# Patient Record
Sex: FEMALE | Race: WHITE | NOT HISPANIC OR LATINO | Employment: PART TIME | URBAN - METROPOLITAN AREA
[De-identification: names, ages, dates, MRNs, and addresses within clinical notes are randomized per-mention and may not be internally consistent; named-entity substitution may affect disease eponyms.]

---

## 2017-01-03 PROBLEM — D17.1 LIPOMA OF BACK: Status: ACTIVE | Noted: 2017-01-03

## 2017-01-03 NOTE — H&P
New Patient Visit Note       Active Problems      1. Lipoma of back        Chief Complaint   Cyst    History of Present Illness   Pt seen at the request of Dr. Fleta Lennox for a lump on his upper back. Noticed this several months ago.   Mild discomfort at the si distention and anal bleeding. Genitourinary: Negative for dysuria, urgency, frequency and difficulty urinating. Musculoskeletal: Negative for myalgias and arthralgias. Skin: Negative for color change and rash.    Neurological: Negative for tremors, sy

## 2017-01-27 NOTE — PROGRESS NOTES
Post Operative Visit Note       Active Problems  1. Lipoma of back         Chief Complaint   Post-Op     History of Present Illness   Doing well. No pain. Back to classes. Pathology results d/w patient - lipoma.       Allergies  Naz Irvin has No Known Aller dysuria, urgency, frequency and difficulty urinating. Musculoskeletal: Negative for myalgias and arthralgias. Skin: Negative for color change and rash. Neurological: Negative for tremors, syncope and weakness.    Hematological: Negative for adenopathy

## 2019-01-07 NOTE — PATIENT INSTRUCTIONS
Advice rest, heating pad, meloxicam as needed. Take cyclobenzaprine as needed. Medication will make you drowsy, so no driving after taking medication. Start physical therapy.   Return to clinic if any questions, worsening course,  new concerns or no impr

## 2019-01-07 NOTE — PROGRESS NOTES
[facility] [place]  PROGRESS NOTE  Chief Complaint:   Patient presents with:  Establish Care  Neck Pain: Left side      HPI:   This is a 24year old male presents to clinic complaining of left upper back and left neck pain that has been going on vomiting, constipation, diarrhea, or blood in stool. MUSCULOSKELETAL: See HPI  NEUROLOGICAL:  Denies headache, dizziness, syncope, numbness or tingling. HEMATOLOGIC:  Denies anemia, bleeding or bruising.   LYMPHATICS:  Denies enlarged nodes   PSYCHIATRIC: EXTERNAL    Other orders  -     Cyclobenzaprine HCl 5 MG Oral Tab; Take 1 tablet (5 mg total) by mouth nightly as needed for Muscle spasms.  -     Meloxicam 7.5 MG Oral Tab; Take 1 tablet (7.5 mg total) by mouth 2 (two) times daily.       Patient Instructio

## 2019-02-08 ENCOUNTER — WALK IN (OUTPATIENT)
Dept: URGENT CARE | Age: 24
End: 2019-02-08

## 2019-02-08 VITALS
HEIGHT: 72 IN | WEIGHT: 220 LBS | RESPIRATION RATE: 16 BRPM | TEMPERATURE: 98 F | HEART RATE: 76 BPM | SYSTOLIC BLOOD PRESSURE: 112 MMHG | DIASTOLIC BLOOD PRESSURE: 78 MMHG | BODY MASS INDEX: 29.8 KG/M2

## 2019-02-08 DIAGNOSIS — J06.9 VIRAL UPPER RESPIRATORY INFECTION: Primary | ICD-10-CM

## 2019-02-08 PROCEDURE — 99213 OFFICE O/P EST LOW 20 MIN: CPT | Performed by: NURSE PRACTITIONER

## 2019-02-08 RX ORDER — FLUTICASONE PROPIONATE 50 MCG
2 SPRAY, SUSPENSION (ML) NASAL DAILY
Qty: 16 G | Refills: 0 | Status: SHIPPED | OUTPATIENT
Start: 2019-02-08

## 2019-02-08 ASSESSMENT — ENCOUNTER SYMPTOMS
FATIGUE: 1
GASTROINTESTINAL NEGATIVE: 1
RESPIRATORY NEGATIVE: 1
SINUS PAIN: 0
SINUS PRESSURE: 1
TROUBLE SWALLOWING: 0
EYES NEGATIVE: 1
FEVER: 0

## 2019-03-23 NOTE — PROGRESS NOTES
CHIEF COMPLAINT:   Patient presents with:  Ear Pain: Right ear  Headache  Jaw Pain: Right side      HPI:   Filiberto Johnson is a 24year old male who presents to clinic today with complaints of pain to left ear and to jaw- no pain with chewing   But has ha mucosa pink, moist. Posterior pharynx not erythematous or injected. No exudates. - small ulceration to left buccal surface- mild erythema, mildly tender  NECK: supple, non-tender  THYROID: Normal size, no nodules  LUNGS: clear to auscultation bilaterally, n

## 2019-03-23 NOTE — PATIENT INSTRUCTIONS
Rinse with Biotene-     Monitor your mouth sore- if it gets worse- more painful, red, fever,etc- then fill Prescription for keflex   Avoid chewy foods, excessive talking and gum, and foods that require you to open mouth widely.   Ice to jaw if helpful, bite

## 2019-03-27 NOTE — PROGRESS NOTES
[address]  PROGRESS NOTE  Chief Complaint:   Patient presents with: Follow - Up: From EL visit      HPI:   This is a 24year old male presents to clinic for follow-up on stomatitis.   Few days ago patient bit his cheek very hard, has so palpitations, edema, dyspnea on exertion or at rest.  RESPIRATORY:  Denies shortness of breath, wheezing, cough or sputum. GASTROINTESTINAL:  Denies abdominal pain, nausea, vomiting, constipation, diarrhea, or blood in stool.   MUSCULOSKELETAL:  Denies wea orders for this visit:    Stomatitis    Aphthous ulcer of mouth    Other orders  -     triamcinolone acetonide 0.1 % Mouth/Throat Paste; Place 1 Application onto teeth 3 (three) times daily.  Inside L side of mouth        Patient Instructions   Recommend to

## 2019-03-27 NOTE — PATIENT INSTRUCTIONS
Recommend to finish the course of antibiotics. Use topical steroid as needed   Avoid any acidic or spicy food. Return to clinic in 1-2 weeks if no improvement. Sooner if symptoms gets worse.

## 2019-04-25 NOTE — PATIENT INSTRUCTIONS
Rhinocort 2 sprays each nostril once a day      netti pot -        Follow up if symptoms persist or increase - would recommend ENT

## 2019-04-25 NOTE — PROGRESS NOTES
Kathy Swan is a 24year old male. Patient presents with:   Other: food in nose      HPI:   Complaints of eating hash browns- started laughing and they went up his nose from the back   Feels stuffed up in the back of throat/nose   Tried piper pot   Noo RRR without murmur, no edema    ASSESSMENT AND PLAN:     Nasal congestion  Foreign body in nose, initial encounter  (primary encounter diagnosis)    No orders of the defined types were placed in this encounter.       Meds & Refills for this Visit:  Requeste

## 2019-06-13 NOTE — PROGRESS NOTES
Park Head is a 24year old male.   Patient presents with:  Stomach Pain  Diarrhea  Gas      HPI:   Complaints of 5 weeks ago had wisdom teeth -  antibiotic for 3 weeks- sinusitis   Then was on prednisone  For a week (ended last tue 6/4) - had GERD prob 74\"   Wt 231 lb   SpO2 97%   BMI 29.66 kg/m²   GENERAL: well developed, well nourished,in no apparent distress  SKIN: no rashes,no suspicious lesions  HEENT: atraumatic, normocephalic,ears and throat are clear  NECK: supple,no adenopathy, normal thyroid,

## 2019-06-18 NOTE — TELEPHONE ENCOUNTER
Pt c/o severe stomach pain (right and left sided) and pain radiating down into testicular area. Pt states pt is worse today. Also has noticed mucous in stool and black chunks. Pt denies fever, denies vomiting. Pt states he doesn't know what to do.   Pt in

## 2019-06-18 NOTE — TELEPHONE ENCOUNTER
Pt agreed. Pt stated he was at ER at this time. Pt asked to have appt canceled tomorrow. Canceled as requested.

## 2019-06-21 PROBLEM — N28.1 CYST OF RIGHT KIDNEY: Status: ACTIVE | Noted: 2019-06-21

## 2019-06-21 PROBLEM — I88.0 ACUTE MESENTERIC ADENITIS: Status: ACTIVE | Noted: 2019-06-21

## 2019-06-21 PROBLEM — R93.89 ABNORMAL CT OF THE CHEST: Status: ACTIVE | Noted: 2019-06-21

## 2019-06-21 PROBLEM — N20.0 RIGHT KIDNEY STONE: Status: ACTIVE | Noted: 2019-06-21

## 2019-06-21 PROBLEM — K42.9 UMBILICAL HERNIA WITHOUT OBSTRUCTION AND WITHOUT GANGRENE: Status: ACTIVE | Noted: 2019-06-21

## 2019-06-21 NOTE — PATIENT INSTRUCTIONS
Start cephalexin for 10 days. Drink plenty of fluids and don't hold urine for long time. See urologist for kidney stone and kidney cyst.   Go to ER if increase in pain, blood in urine or fever. Recheck in 1 month to check xray.    See Jesús Viera for

## 2019-06-21 NOTE — PROGRESS NOTES
[facility]  [facility]  Chief Complaint:   Patient presents with:  ER F/U      HPI:   This is a 24year old male presents to clinic for follow-up on ER 3 days.   Patient was evaluated for abdominal pain in emergency room, had a labs do CONSTITUTIONAL:  Denies unusual weight gain/loss, fever, chills, or fatigue. EYES:  Denies eye pain, visual loss, blurred vision, double vision or yellow sclerae. HEENT:  Denies hearing loss, sneezing, congestion, runny nose or sore throat.   Chrystine Croak lymphadenopathy, no other lymphadenopathy. MUSCULOSKELETAL: Normal ROM, no joint pain, or muscle weakness in all extremity.    BACK: No tenderness, no spasm, no CVA tenderness noted  NEUROLOGICAL:  No deficit, normal gait, strength and tone, normal reflexe check     Lipoma of back     Acid reflux     Abnormal CT of the chest     Right kidney stone     Cyst of right kidney     Acute mesenteric adenitis     Umbilical hernia without obstruction and without gangrene      Aubree Lehman MD      This note was creat

## 2019-06-25 NOTE — TELEPHONE ENCOUNTER
Recommend to follow up next week for recheck. Ok to wait one week, patient may schedule with other provider if any appointment available.

## 2019-06-25 NOTE — TELEPHONE ENCOUNTER
went to ER over the weekend with chest pains, hospital said wasn't having a heart attack, still have some chest pains, transferred call to [facility]

## 2019-06-25 NOTE — TELEPHONE ENCOUNTER
Transferred call from the . Patient called to set up appt for a ER follow up for chest pain. Patient mentioned to still have similar symptoms that he had at the ER. Luisa transferred call.      Patient states that he was in the ER on 6/22 fo

## 2019-06-25 NOTE — TELEPHONE ENCOUNTER
Patient called back and stating that he was diagnosed with costochondritis. Patient states that he was advised to follow up with PCP in 2-3 days. Told patient I would speak to Dr Marlin Duff and call tomorrow if we can fit him in this week.

## 2019-06-26 NOTE — TELEPHONE ENCOUNTER
Set up appt with [facility].    Future Appointments   Date Time Provider Aime Billingsley   6/26/2019  3[facility] [place]   6/27/2019  4:00 PM ADA Benjamin [facility] [place] [facility] [place]   7/1/2019 10:50 AM [place]

## 2019-06-27 NOTE — PATIENT INSTRUCTIONS
Start Lexapro 10mg a day. Return in 1 week for follow up. Follow up with Gina Menendez in 2 weeks as previously indicated. Increase your fluid and fiber intake, such as pears, prunes, plums, apples. Avoid dairy products.      You have been prescribed or are c this medicine in children. Special care may be needed. What side effects may I notice from receiving this medicine?   Side effects that you should report to your doctor or health care professional as soon as possible:  · allergic reactions like skin rash, naratriptan, rizatriptan, sumatriptan, zolmitriptan  · certain medicines for sleep  · certain medicines that treat or prevent blood clots like warfarin, enoxaparin, dalteparin  · cimetidine  · diuretics  · fentanyl  · furazolidone  · isoniazid  · lithium Also watch out for sudden changes in feelings such as feeling anxious, agitated, panicky, irritable, hostile, aggressive, impulsive, severely restless, overly excited and hyperactive, or not being able to sleep.  If this happens, especially at the beginning

## 2019-06-27 NOTE — PROGRESS NOTES
[facility] [place]  PROGRESS NOTE  Chief Complaint:   Patient presents with:  ER F/U  Blood In Stool      HPI:   This is a 24year old male ER follow up, anxiety, and blood in stool. Multiple ER visits in the last week.      Was given alprazo escitalopram (LEXAPRO) 10 MG Oral Tab Take 1 tablet (10 mg total) by mouth daily. Disp: 30 tablet Rfl: 0   ALPRAZolam 0.25 MG Oral Tab Take 0.25 mg by mouth 2 (two) times daily as needed.    Disp:  Rfl:    cephALEXin 500 MG Oral Cap Take 1 capsule (500 mg t NECK: Supple, no carotid bruit, no JVD, no thyromegaly. LUNGS: Clear to auscultation bilterally, no rales/rhonchi/wheezing. HEART:  Regular rate and rhythm, S1 and S2 are normal, no murmurs, rubs or gallops.   EXTREMITIES: No edema, no cyanosis, no clubbi Patient/Caregiver Education: Patient/Caregiver Education: There are no barriers to learning. Medical education done. Outcome: Patient verbalizes understanding.  Patient is notified to call with any questions, complications, allergies, or worsening or sinclair Take this medicine by mouth with a glass of water. Follow the directions on the prescription label. You can take it with or without food. If it upsets your stomach, take it with food. Take your medicine at regular intervals.  Do not take it more often than · certain medicines for fungal infections like fluconazole, itraconazole, ketoconazole, posaconazole, voriconazole  · cisapride  · citalopram  · dofetilide  · dronedarone  · linezolid  · MAOIs like Carbex, Eldepryl, Marplan, Nardil, and Parnate  · methylen · an unusual or allergic reaction to escitalopram, the related drug citalopram, other medicines, foods, dyes, or preservatives  · pregnant or trying to become pregnant  · breast-feeding  What should I watch for while using this medicine?   Tell your doctor Routine infant or child health check     Lipoma of back     Acid reflux     Abnormal CT of the chest     Right kidney stone     Cyst of right kidney     Acute mesenteric adenitis     Umbilical hernia without obstruction and without gangrene      Angelique Bhatt

## 2019-06-29 NOTE — TELEPHONE ENCOUNTER
Patient is calling stating that he saw [facility] on 6/27 for blood in stool. Patient states that that night after seeing [facility] he has 4 or 5 diarrhea episodes. Patient states he also has no appetite now, a little dizziness, and a headache on and off.      P

## 2019-06-29 NOTE — TELEPHONE ENCOUNTER
Recommend Gatorade and plenty of fluid over the weekend. Also recommend brat diet. Return to clinic next week if continued to have blood in stool or diarrhea does not improve.   Recommend go to the emergency room if any increase in dizziness, abdominal pa

## 2019-07-01 NOTE — PROGRESS NOTES
[address]  PROGRESS NOTE  Chief Complaint:   Patient presents with: Anxiety: Possible SE to medication - not sleeping, neck pain, no appitite       HPI:   This is a 24year old male multiple medical complaints. Here with his fiancee. 500 mg by mouth 2 (two) times daily. Disp:  Rfl:    escitalopram (LEXAPRO) 10 MG Oral Tab Take 1 tablet (10 mg total) by mouth daily.  Disp: 30 tablet Rfl: 0   cephALEXin 500 MG Oral Cap Take 1 capsule (500 mg total) by mouth 3 (three) times daily for 10 da bowel sounds normal in all 4 quadrants, no Masses, no hepatosplenomegaly. SKIN: No rashes, no skin lesion, no bruising, good turgor. LYMPHATIC: No cervical lymphadenopathy, no other lymphadenopathy.   MUSCULOSKELETAL: Normal ROM, no joint pain, or muscle done.   Outcome: Patient verbalizes understanding. Patient is notified to call with any questions, complications, allergies, or worsening or changing symptoms.   Patient is to call with any side effects or complications from the treatments as a result of to

## 2019-07-01 NOTE — PATIENT INSTRUCTIONS
Continue lexapro at this time. Do not nap during the day. Try two walks a day, once in the morning and once at night. Try a warm shower before bed and sleeping in a cool room. Try alternating heat and cold packs 20 minutes at a time four times a day.

## 2019-07-01 NOTE — TELEPHONE ENCOUNTER
Pt called. Concerned with decreased appetite, some changes in bowels and anxious feeling. Started lexapro and in [facility] with Herb Blum. Pt also has xanax RX. D. w pt ok to take xanax 2 x today ( already took this am).  I encourged hm to also call for f

## 2019-07-03 PROBLEM — G43.109 COMPLICATED MIGRAINE: Status: ACTIVE | Noted: 2019-07-03

## 2019-07-05 NOTE — TELEPHONE ENCOUNTER
Re:   poss side effects of new medication  ( Lexapro)    -  Insomina issue frist but cleared up  - now Migrane headache

## 2019-07-05 NOTE — TELEPHONE ENCOUNTER
lexapro does not cause migrine. Recommend to take medication in morning after breakfast.   If symptoms continues then recommend to return to clinic.

## 2019-07-10 NOTE — PATIENT INSTRUCTIONS
Continue lexapro. Wean xanax as able. Continue with medication, counseling sessions, and routine exercise. Return in 2 weeks for follow up.

## 2019-07-10 NOTE — PROGRESS NOTES
[address]  PROGRESS NOTE  Chief Complaint:   Patient presents with: Anxiety  Follow - Up      HPI:   This is a 24year old male anxiety follow up. Patient here for a follow up.       Reports feeling as if he's gotten back to his \"n Denies chest pain, chest pressure, chest discomfort, palpitations, edema, dyspnea on exertion or at rest.  RESPIRATORY:  Denies shortness of breath, wheezing, cough or sputum.   GASTROINTESTINAL:  Denies abdominal pain, nausea, vomiting, constipation, diarr depressed mood or anxiety. ASSESSMENT AND PLAN:   Nadja Helton was seen today for anxiety and follow - up. Diagnoses and all orders for this visit:    Anxiety  Continue lexapro 10mg, discussed not discontinuing medication on his own.    Wean Xanax, use P

## 2019-07-17 NOTE — PROGRESS NOTES
[facility] [place]      HPI:   Tigist Goddard is a 21year old male who presents for an Annual Health Visit. Patient starting intensive group therapy starting today for his anxiety.   Patient has been on his lexapro and felt symptoms have OTHER      wisdom teeth       Family History   Problem Relation Age of Onset   • Asthma Mother    • Anxiety Mother    • Depression Father    • Hypertension Father    • Other (sleep apnea) Father    • Cancer Maternal Grandmother    • Alcohol and Other Disor oz   SpO2 99%   BMI 28.43 kg/m²    Wt Readings from Last 6 Encounters:  07/17/19 : 221 lb 6.4 oz  07/10/19 : 224 lb  07/01/19 : 218 lb 9.6 oz  06/27/19 : 223 lb 8 oz  06/21/19 : 222 lb 9.6 oz  06/13/19 : 231 lb    Estimated body mass index is 28.43 kg/m² a prescribed. Continue with intensive outpatient Psychiatric therapy. Continue follow up with Dr. Gilbert Resides next week as previously scheduled for anxiety and x-ray follow up. Routine yearly physical in 1 year.        The patient indicates understanding o

## 2019-07-17 NOTE — PATIENT INSTRUCTIONS
Make an appointment for fasting lipid panel as well as a thyroid panel. Continue with medications as prescribed. Continue with intensive outpatient Psychiatric therapy.     Continue follow up with Dr. Jv Vides next week as previously scheduled for anxiety a

## 2019-07-22 NOTE — TELEPHONE ENCOUNTER
----- Message from Camryn Antoine sent at 7/22/2019  4:32 PM CDT -----  Returned call.    Please call [phone]

## 2019-07-22 NOTE — TELEPHONE ENCOUNTER
----- Message from ADA Savage sent at 7/22/2019  9:02 AM CDT -----  Please let patient know his cholesterol levels and his thyroid panel are in a normal range.

## 2019-07-25 NOTE — PROGRESS NOTES
[facility] [place]  PROGRESS NOTE  Chief Complaint:   Patient presents with:  ER F/U: F/U after ER and said that he was needing a chest xray to see whats going on with his lungs      HPI:   This is a 21year old male with history of anxiety and Allergies:    Dander                  HIVES    Comment:Dogs & cats. Some dog breeds = Hives; but usually,             just sneezing  Current Meds:    Current Outpatient Medications:  naproxen 500 MG Oral Tab Take 500 mg by mouth 2 (two) times daily.  Tigist Levine normal, no murmurs, rubs or gallops. ABDOMEN: Soft, nondistended, nontender, bowel sounds normal in all 4 quadrants, no Masses, no hepatosplenomegaly. SKIN: No rashes, no skin lesion, no bruising, good turgor.   LYMPHATIC: No cervical lymphadenopathy, no

## 2019-07-25 NOTE — PATIENT INSTRUCTIONS
Continue current medication and therapy. Keep appt with darnell. Check xray today. Return to clinic if any concern.

## 2019-07-26 NOTE — TELEPHONE ENCOUNTER
Patient informed of the following below. Patient is wondering if this clears him from the focal opacity seen on the CT scan from June or what would be his next steps regarding that?

## 2019-07-26 NOTE — TELEPHONE ENCOUNTER
----- Message from Himanshu Ott MD sent at 7/25/2019  6:04 PM CDT -----  Please inform patient that radiologist report shows normal chest x-ray.

## 2019-08-05 PROBLEM — M94.0 COSTOCHONDRITIS: Status: ACTIVE | Noted: 2019-08-05

## 2019-08-05 NOTE — PROGRESS NOTES
[facility] [place]  PROGRESS NOTE  Chief Complaint:   Patient presents with:  Pain: Upper back/shoulder pain   Follow - Up: F/u chostrochondritis      HPI:   This is a 21year old male coming in for follow up from costochondritis.     Patient rep • Diabetes Paternal Grandfather    • Heart Disorder Paternal Grandfather      Allergies:    Dander                  HIVES    Comment:Dogs & cats.  Some dog breeds = Hives; but usually,             just sneezing  Current Meds:    Current Outpatient Medicatio /72   Pulse 82   Temp 97.1 °F (36.2 °C) (Tympanic)   Resp 16   Wt 228 lb   SpO2 98%   BMI 29.27 kg/m²  Estimated body mass index is 29.27 kg/m² as calculated from the following:    Height as of 7/25/19: 74\". Weight as of this encounter: 228 lb. Patient verbalized understanding and agrees to plan of care. Patient/Caregiver Education: Patient/Caregiver Education: There are no barriers to learning. Medical education done. Outcome: Patient verbalizes understanding.  Patient is notified to call · If you feel that emotional stress is a cause of your condition, try to figure out the sources of that stress. It may not be obvious. Learn ways to deal with the stress in your life.  This can include regular exercise, muscle relaxation, meditation, or sim Routine infant or child health check     Lipoma of back     Acid reflux     Abnormal CT of the chest     Right kidney stone     Cyst of right kidney     Acute mesenteric adenitis     Umbilical hernia without obstruction and without gangrene     Complica

## 2019-08-05 NOTE — PATIENT INSTRUCTIONS
Restart naproxen 500mg twice a day for the next week; take with food. Warm, moist heat such as a warm bath or warm shower twice a day. Gentle massage to areas as able. Do light stretching twice a day for the upper chest and back as able.   Enjoy your wed Use this with or without a medicated skin cream that helps relieves pain. · Do stretching exercise as advised by your provider. · Take any prescribed medicines as directed.   Follow-up care  Follow up with your healthcare provider, or as advised, if you d

## 2019-08-24 NOTE — TELEPHONE ENCOUNTER
Future appt:    Last Appointment with provider:   8/5/2019   Last appointment at [facility] [place]:  8/20/2019 with Dr. Ofelia Cardoso for Anxiety; Return in about 3 months (around 10/25/2019) for follow up.      Cholesterol, Total (mg/dL)   Date Value   07/20/2019 117

## 2019-09-04 NOTE — PROGRESS NOTES
Shirlean Canavan is a 21year old male. Patient presents with:  Pain: Still having costochondritis pain off and on - any other treatment?       HPI:   Complaints of pain to sternum-  Pain to left shoulder blade with deep breaths- sometimes left shoulder some • Depression Father    • Hypertension Father    • Other (sleep apnea) Father    • Cancer Maternal Grandmother    • Alcohol and Other Disorders Associated Maternal Grandmother    • Cancer Maternal Grandfather    • Alcohol and Other Disorders Associated Ma this encounter       Imaging & Consults:  CHIROPRACTIC OFFICE VISIT - EXTERNAL  PHYSICAL THERAPY EXTERNAL    No follow-ups on file.   Patient Instructions   Chiropractor, Dr. Puente Meth [phone]     physical therapy -     For your stomach avoid c

## 2019-09-04 NOTE — PATIENT INSTRUCTIONS
Chiropractor, Dr. Brown Sheets [phone]     physical therapy -     For your stomach avoid caffeine, alcohol, cigarettes, spicy/acidic foods, and peppermint. Also eat small meals, do not eat before bedtime, also avoid ibuprofen/Aleve/aspirin.

## 2019-09-12 NOTE — PATIENT INSTRUCTIONS
-Tetanus, diptheria, pertussis - due every 10 years    - flu shot today-  Due every fall     -Gardasil 9 (HPV) - #1 today-  Follow up for #2 in 2 months (November) and #3 in 6 months (March).     We will check blood work for HIV, syphilis, hepatitis A, B, a

## 2019-09-14 NOTE — TELEPHONE ENCOUNTER
----- Message from ADA Faulkner sent at 9/14/2019  8:34 AM CDT -----  Please notify patient that his STD tests have come back negative for gonorrhea, chlamydia, trichomonas, syphilis, HIV, hepatitis A, B, C.   The herpes test is still pending-we wi

## 2019-09-16 NOTE — TELEPHONE ENCOUNTER
Informed pt of his herpes results. Pt has received all this other test results on Saturday. See phone note from 9/14/19 from [facility]. Pt expressed understanding and thanks.

## 2019-09-16 NOTE — TELEPHONE ENCOUNTER
----- Message from ADA Willson sent at 9/15/2019 10:35 PM CDT -----  Please notify patient that his herpes simplex is negative.

## 2019-09-16 NOTE — PROGRESS NOTES
Daniele Mccray is a 21year old male. Patient presents with:  STD      HPI:   Complaints of patient presents to the office today for STD testing.   Patient states that he has not had any current exposure, but is going to be having a new partner and would l Other Disorders Associated Maternal Grandfather    • Diabetes Paternal Grandfather    • Heart Disorder Paternal Grandfather         Allergies    Dander                  HIVES    Comment:Dogs & cats.  Some dog breeds = Hives; but usually,             just sn prescriptions requested or ordered in this encounter       Imaging & Consults:  FLULAVAL INFLUENZA VACCINE QUAD PRESERVATIVE FREE 0.5 ML  HPV HUMAN PAPILLOMA VIRUS VACC 9 BHRATI 3 DOSE IM  TETANUS, DIPHTHERIA TOXOIDS AND ACELLULAR PERTUSIS VACCINE (TDAP), >7

## 2019-10-10 NOTE — TELEPHONE ENCOUNTER
Patient came to the office today to see another provider and mention to them that he was having some heart palpitations. Brought patient back to a room and triaged his symptoms.   Patient states that he notices the heart palpations mostly after he eats a

## 2019-10-11 NOTE — PROGRESS NOTES
HPI:    Patient ID: Dorothea Morton is a 21year old male. HPI     Been feeling heart beats are beating harder, but not faster. Worse after eating or when getting ready for bed. States that during the day has been getting a headache.    Notes that when he naproxen 500 MG Oral Tab, Take 500 mg by mouth 2 (two) times daily as needed. , Disp: , Rfl:   Azelastine HCl 0.1 % Nasal Solution, 2 sprays by Nasal route 2 (two) times daily. , Disp: 1 Bottle, Rfl: 5      Allergies:  Dander                  HIVES    Comm Imaging & Referrals:  ELECTROCARDIOGRAM, COMPLETE  Normal sinus rhythm, heart rate 80, no ectopy    Patient Instructions   EKG - normal    Normal exam.     Trial of omeprazole for the reflux. Call if not improving. Return to clinic if worsens.        Om · muscle spasm  · palpitations  · rash on cheeks or arms that gets worse in the sun  · redness, blistering, peeling or loosening of the skin, including inside the mouth  · seizures  · stomach polyps  · tremors  · unusual bleeding or bruising  · unusually w · an unusual or allergic reaction to omeprazole, other medicines, foods, dyes, or preservatives  · pregnant or trying to get pregnant  · breast-feeding  What should I watch for while using this medicine?   It can take several days before your heartburn gets

## 2019-10-11 NOTE — PATIENT INSTRUCTIONS
EKG - normal    Normal exam.     Trial of omeprazole for the reflux. Call if not improving. Return to clinic if worsens. Omeprazole tablets (OTC)  Brand Name: Prilosec OTC  What is this medicine?   OMEPRAZOLE (oh ME pray zol) prevents the producti the skin, including inside the mouth  · seizures  · stomach polyps  · tremors  · unusual bleeding or bruising  · unusually weak or tired  · yellowing of the eyes or skin  Side effects that usually do not require medical attention (report to your doctor or watch for while using this medicine? It can take several days before your heartburn gets better. Check with your doctor or health care professional if your condition does not start to get better, or if it gets worse.   Do not treat diarrhea with over the c

## 2019-10-23 NOTE — TELEPHONE ENCOUNTER
Future Appointments   Date Time Provider Aime Billingsley   10/24/2019  1:30 PM Andrew Moulds, LC[facility] [place]     No follow up noted

## 2019-11-25 NOTE — TELEPHONE ENCOUNTER
Last refill 10/23/19  Future Appointments   Date Time Provider Aime Billingsley   11/26/2019  1:30 PM Glory Marts, LCPC [facility] [place]     Last visit: annual exam 7/17/19 with Mikey Helton    Instructions         Return in 1 year (on 7/17/2020).

## 2019-12-05 NOTE — TELEPHONE ENCOUNTER
Patient is asking for a referral for [facility] through [facility].      [facility] [address] [place] [address]  [phone]

## 2019-12-11 NOTE — TELEPHONE ENCOUNTER
[facility] in [facility] sent a request for all of pt's medical records. This was sent to [employer].

## 2019-12-18 NOTE — PATIENT INSTRUCTIONS
Continue current medications  Increase lexapro to 20 mg daily. Continue with counselor. Schedule holter monitor. Start omeprazole again. Return to clinic if no improvement in symptoms.

## 2019-12-27 NOTE — TELEPHONE ENCOUNTER
Returned your call. Also would like refill on Lexapro called to [facility].  [phone]    Fax [phone].

## 2019-12-27 NOTE — TELEPHONE ENCOUNTER
Let pt know the following below. Pt verbalized his understanding and had no other questions at this time. Patient is also asking for a refill of his lexapro.   Future Appointments   Date Time Provider Aime Billingsley   1/6/2020 11:00 AM Luisa Cleveland
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
## 2020-02-11 NOTE — TELEPHONE ENCOUNTER
Future appt:    Last Appointment with provider:   1/16/2020 follow up; no follow up noted  [place] [facility] [place] [place]:  1/16/2020  Cholesterol, Total (mg/dL)   Date Value   07/20/2019 117     HDL Cholesterol (mg/dL)   Date Value   07/20/2019 53     L

## 2020-03-06 NOTE — TELEPHONE ENCOUNTER
Future appt:    Last Appointment with provider:   1/16/2020  Last appointment at [facility] [place]:  1/16/2020  Cholesterol, Total (mg/dL)   Date Value   07/20/2019 117     HDL Cholesterol (mg/dL)   Date Value   07/20/2019 53     LDL Cholesterol (mg/dL)   Date

## 2020-04-07 NOTE — TELEPHONE ENCOUNTER
Future appt:    Last Appointment with provider:   1/16/2020  Last appointment at [facility] [place]:  1/16/2020  Cholesterol, Total (mg/dL)   Date Value   07/20/2019 117     HDL Cholesterol (mg/dL)   Date Value   07/20/2019 53     LDL Cholesterol (mg/dL)   Date

## 2020-05-15 NOTE — TELEPHONE ENCOUNTER
Future appt:    Last Appointment with provider:   1/16/2020  Last appointment at [facility] [place]:  1/16/2020  Cholesterol, Total (mg/dL)   Date Value   07/20/2019 117     HDL Cholesterol (mg/dL)   Date Value   07/20/2019 53     LDL Cholesterol (mg/dL)   Date

## 2020-06-12 NOTE — TELEPHONE ENCOUNTER
Please advise refill of Escitalopram 20mg. Last Rx: 5/15/20      Future appt:     Last Appointment with provider:   12/18/19 for Anxiety - per notes - Return in about 3 months     Last appointment at [facility]:  1/16/2020  Cholesterol, Total (mg/dL)   Date Value   07/20/2019 117     HDL Cholesterol (mg/dL)   Date Value   07/20/2019 53     LDL Cholesterol (mg/dL)   Date Value   07/20/2019 49     Triglycerides (mg/dL)   Date Value   07/20/2019 75     No results found for: EAG, A1C  Lab Results   Component Value Date    T4F 1.1 07/20/2019    TSH 1.620 07/20/2019       No follow-ups on file.

## 2020-06-15 NOTE — TELEPHONE ENCOUNTER
Future appt:    Last Appointment with provider:   1/16/2020  Last appointment at [facility]:  1/16/2020    Pt states he took his last Escitalopram today. Pt states he was given 3 day extension with pharmacy.   Pt informed he needs medication follow up ap
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
## 2021-09-06 NOTE — ED PROVIDER NOTES
Patient Seen in: [facility] Emergency Department      History   Patient presents with:  Eval-G    Stated Complaint: Groin pain and nausea    HPI/Subjective:   HPI    This is a pleasant 16-year-old presenting to the emergency department for possible cardiovascular exam shows rate rhythm abdomen soft nontender uncircumcised male no penile discharge no testicular masses no inguinal hernias present no inguinal lymphadenopathy extremity exam is normal skin is intact no focal deficits on neurologic exam List

## 2021-09-09 PROBLEM — R10.31 RIGHT GROIN PAIN: Status: ACTIVE | Noted: 2021-09-09

## 2021-09-09 NOTE — H&P
Office Visit H&P       Active Problems      1. Right groin pain        Chief Complaint   Patient presents with:  Hernia: NW PT ref by ER for RT ING hernia.  PT states that he started having ABD PN (6/10) on and off that moves across ABD, has diarrhea, nause Sister    • Depression Brother      Social History    Socioeconomic History      Marital status: Single      Spouse name: Not on file      Number of children: Not on file      Years of education: Not on file      Highest education level: Not on file    Tob drainage  Eyes:  Negative for eye discharge or vision loss  Gastrointestinal:  Negative for abdominal pain, constipation, diarrhea, or vomiting  Genitourinary:  Negative for dysuria or hematuria  Hema/Lymph:  Negative for easy bleeding or bruising  Integum

## 2021-09-21 NOTE — ED PROVIDER NOTES
Patient Seen in: [facility] Emergency Department      History   Patient presents with:  Abdomen/Flank Pain    Stated Complaint: abdominal pain.      Subjective:   HPI    Patient is a 50-year-old identifying is female presents emergency room with a hi systems reviewed and negative except as noted above.     Physical Exam     ED Triage Vitals [09/20/21 2218]   BP (!) 165/94   Pulse 85   Resp 16   Temp 97.8 °F (36.6 °C)   Temp src Temporal   SpO2 100 %   O2 Device None (Room air)       Current:/84 other components within normal limits   URINALYSIS WITH CULTURE REFLEX - Abnormal; Notable for the following components:    Clarity Urine Hazy (*)     Ketones Urine Trace (*)     Urobilinogen Urine 2.0 (*)     Leukocyte Esterase Urine Trace (*)     WBC Roro Casey Impression:  Abdominal pain, acute  (primary encounter diagnosis)     Disposition:  Discharge  9/21/2021  3:45 am    Follow-up:  Jimmy Horta, [address] [place] [address]   [phone]    Call in 2 days            Medications Pres

## 2021-09-21 NOTE — ED INITIAL ASSESSMENT (HPI)
Pt reports abdominal pain for past week, Miralax taken last noc with little relief. Denies vomiting. Denies urinary symptoms.

## 2022-03-26 NOTE — ED INITIAL ASSESSMENT (HPI)
C/O lower abdominal pain on and off for a week and got worse for few days. Feeling nausea. No vomiting. Denies fever.

## 2022-07-29 NOTE — ED INITIAL ASSESSMENT (HPI)
Headache started last night. Described as pressure. Pt takes sudafed. Pt requesting  covid test. Pt is vaccinated with booster.

## 2022-08-04 NOTE — ED INITIAL ASSESSMENT (HPI)
Pt states that he tested positive for COVID one week ago. Pt c/o of intermittent left sided CP, SOB, and productive cough.

## 2022-08-08 NOTE — ED INITIAL ASSESSMENT (HPI)
C/o sinus pain and pressure for 2 days with post nasal drip. Home kit Covid test positive on 7/29/2022. Covid symptoms-cough, fatigue and headache better.

## 2022-09-27 NOTE — ED INITIAL ASSESSMENT (HPI)
LUQ abdominal pain for about 3 months intermittently, worse in the last few days. +nausea. Denies fevers. Reports pain is worse after eating.

## 2022-12-13 NOTE — DISCHARGE INSTRUCTIONS
Follow-up with your primary care physician this week  Follow-up with gastroenterology for further work-up and evaluation, may need colonoscopy.   Call GI office today for follow-up appointment  Go to the emergency room if worse or increased bleeding

## 2022-12-13 NOTE — ED INITIAL ASSESSMENT (HPI)
Right sided abdominal pain since Wednesday. Reports 3 episodes of bright red blood in stool.      +Nausea

## 2023-01-10 ENCOUNTER — ANESTHESIA EVENT (OUTPATIENT)
Dept: GASTROENTEROLOGY | Age: 28
End: 2023-01-10

## 2023-01-10 ENCOUNTER — ANESTHESIA (OUTPATIENT)
Dept: GASTROENTEROLOGY | Age: 28
End: 2023-01-10

## 2023-01-10 ENCOUNTER — HOSPITAL ENCOUNTER (OUTPATIENT)
Dept: GASTROENTEROLOGY | Age: 28
Discharge: HOME | End: 2023-01-10
Attending: INTERNAL MEDICINE

## 2023-01-10 VITALS
OXYGEN SATURATION: 100 % | TEMPERATURE: 97.7 F | WEIGHT: 250.88 LBS | HEIGHT: 72 IN | SYSTOLIC BLOOD PRESSURE: 135 MMHG | DIASTOLIC BLOOD PRESSURE: 68 MMHG | HEART RATE: 76 BPM | RESPIRATION RATE: 16 BRPM | BODY MASS INDEX: 33.98 KG/M2

## 2023-01-10 DIAGNOSIS — R10.9 PAIN, ABDOMINAL: ICD-10-CM

## 2023-01-10 DIAGNOSIS — K62.5 HEMORRHAGE OF RECTUM AND ANUS: ICD-10-CM

## 2023-01-10 PROCEDURE — 13000024 HB GI COMPLEX CASE S/U + 1ST 15 MIN

## 2023-01-10 PROCEDURE — 10004451 HB PACU RECOVERY 1ST 30 MINUTES

## 2023-01-10 PROCEDURE — 10002801 HB RX 250 W/O HCPCS

## 2023-01-10 PROCEDURE — 10002800 HB RX 250 W HCPCS

## 2023-01-10 PROCEDURE — 10002807 HB RX 258: Performed by: INTERNAL MEDICINE

## 2023-01-10 PROCEDURE — 13000001 HB PHASE II RECOVERY EA 30 MINUTES

## 2023-01-10 PROCEDURE — 13000025 HB GI COMPLEX CASE EACH ADD MINUTE

## 2023-01-10 PROCEDURE — 10002807 HB RX 258

## 2023-01-10 PROCEDURE — 13000008 HB ANESTHESIA MAC OUTSIDE OR

## 2023-01-10 RX ORDER — PROPOFOL 10 MG/ML
INJECTION, EMULSION INTRAVENOUS PRN
Status: DISCONTINUED | OUTPATIENT
Start: 2023-01-10 | End: 2023-01-10

## 2023-01-10 RX ORDER — MELATONIN 10 MG
1 CAPSULE ORAL NIGHTLY PRN
COMMUNITY

## 2023-01-10 RX ORDER — FLUCONAZOLE 150 MG/1
150 TABLET ORAL PRN
COMMUNITY
Start: 2022-08-21

## 2023-01-10 RX ORDER — SODIUM CHLORIDE 9 MG/ML
INJECTION, SOLUTION INTRAVENOUS CONTINUOUS PRN
Status: DISCONTINUED | OUTPATIENT
Start: 2023-01-10 | End: 2023-01-10

## 2023-01-10 RX ORDER — ESTRADIOL 2 MG/1
4 TABLET ORAL DAILY
COMMUNITY

## 2023-01-10 RX ORDER — KETAMINE HYDROCHLORIDE 50 MG/ML
INJECTION, SOLUTION, CONCENTRATE INTRAMUSCULAR; INTRAVENOUS PRN
Status: DISCONTINUED | OUTPATIENT
Start: 2023-01-10 | End: 2023-01-10

## 2023-01-10 RX ORDER — SODIUM CHLORIDE 9 MG/ML
INJECTION, SOLUTION INTRAVENOUS CONTINUOUS
Status: DISCONTINUED | OUTPATIENT
Start: 2023-01-10 | End: 2023-01-12 | Stop reason: HOSPADM

## 2023-01-10 RX ORDER — PROGESTERONE 100 MG/1
100 CAPSULE ORAL DAILY
COMMUNITY

## 2023-01-10 RX ORDER — MIDAZOLAM HYDROCHLORIDE 1 MG/ML
INJECTION, SOLUTION INTRAMUSCULAR; INTRAVENOUS PRN
Status: DISCONTINUED | OUTPATIENT
Start: 2023-01-10 | End: 2023-01-10

## 2023-01-10 RX ORDER — SPIRONOLACTONE 100 MG/1
200 TABLET, FILM COATED ORAL DAILY
COMMUNITY

## 2023-01-10 RX ORDER — HYDROXYZINE PAMOATE 25 MG/1
25 CAPSULE ORAL 3 TIMES DAILY PRN
COMMUNITY

## 2023-01-10 RX ADMIN — KETAMINE HYDROCHLORIDE 50 MG: 50 INJECTION, SOLUTION INTRAMUSCULAR; INTRAVENOUS at 13:05

## 2023-01-10 RX ADMIN — PROPOFOL 30 MG: 10 INJECTION, EMULSION INTRAVENOUS at 13:05

## 2023-01-10 RX ADMIN — MIDAZOLAM HYDROCHLORIDE 2 MG: 1 INJECTION, SOLUTION INTRAMUSCULAR; INTRAVENOUS at 13:04

## 2023-01-10 RX ADMIN — PROPOFOL 50 MG: 10 INJECTION, EMULSION INTRAVENOUS at 13:11

## 2023-01-10 RX ADMIN — PROPOFOL 50 MG: 10 INJECTION, EMULSION INTRAVENOUS at 13:18

## 2023-01-10 RX ADMIN — SODIUM CHLORIDE: 9 INJECTION, SOLUTION INTRAVENOUS at 12:45

## 2023-01-10 RX ADMIN — SODIUM CHLORIDE: 9 INJECTION, SOLUTION INTRAVENOUS at 13:02

## 2023-01-10 RX ADMIN — PROPOFOL 50 MCG/KG/MIN: 10 INJECTION, EMULSION INTRAVENOUS at 13:05

## 2023-01-10 ASSESSMENT — ACTIVITIES OF DAILY LIVING (ADL)
ADL_SCORE: 12
ADL_BEFORE_ADMISSION: INDEPENDENT
HISTORY OF FALLING IN THE LAST YEAR (PRIOR TO ADMISSION): NO

## 2023-01-10 ASSESSMENT — ENCOUNTER SYMPTOMS
CONSTIPATION: 0
NUMBNESS: 0
WHEEZING: 0
CHILLS: 0
BLOOD IN STOOL: 1
COUGH: 0
DIZZINESS: 0
FATIGUE: 0
TROUBLE SWALLOWING: 0
ABDOMINAL PAIN: 0
BACK PAIN: 0
ABDOMINAL DISTENTION: 0
VOMITING: 0
CONFUSION: 0
SHORTNESS OF BREATH: 0
FEVER: 0
HALLUCINATIONS: 0
NAUSEA: 0
EYE ITCHING: 0
EYE PAIN: 0
HEADACHES: 0
DIARRHEA: 0
FACIAL SWELLING: 0
AGITATION: 0

## 2023-01-10 ASSESSMENT — PAIN SCALES - GENERAL
PAINLEVEL_OUTOF10: 0
PAINLEVEL_OUTOF10: 1
PAINLEVEL_OUTOF10: 0
PAINLEVEL_OUTOF10: 0

## 2023-01-10 ASSESSMENT — PAIN SCALES - WONG BAKER
WONGBAKER_NUMERICALRESPONSE: 0
WONGBAKER_NUMERICALRESPONSE: 0

## 2023-01-20 NOTE — DISCHARGE INSTRUCTIONS
Most people get better in 10 to 14 days. You may continue to cough for 2 to 3 weeks. Colds are caused by viruses and do not get better with antibiotics. Any over-the-counter medications will help relieve your symptoms. Mucinex D, this can be obtained from the pharmacist and a 's license is needed to purchase this.   Otherwise Mucinex DM is a decent alternative  Nasal decongestant sprays such as phenylalanine or Afrin  Afrin should only be used for a maximum of 3 days in a row  Start taking one of the following allergy medications Zyrtec, Claritin or Xyzal daily  Cough syrup such as Delsym or Robitussin can help  You may also take pseudoephedrine (Sudafed), this is also purchased from the pharmacist with a valid 's license  Saline rinses with [place] pot  Drink plenty of fluids  Rest, Tylenol and Motrin for aches and pains  Return for fever, shortness of breath, chest pain

## 2023-07-28 NOTE — DISCHARGE INSTRUCTIONS
Continue Tylenol and Motrin alternating. If any worsening symptoms seek evaluation in the emergency room.
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
## 2023-08-15 NOTE — PATIENT INSTRUCTIONS
Flonase as packet insert  Zyrtec daily for post nasal drip  Keep ENT appointment     Follow-up if not improving
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
## 2023-10-26 NOTE — DISCHARGE INSTRUCTIONS
Push fluids and increase fiber intake. Increase fruits and vegetables. May also use over-the-counter supplement such as Benefiber or fiber 1 bar. Give stool softener such as MiraLAX or Colace to help bowel movements easier. Use 1 capful of MiraLAX once daily for 3 to 5 days if you continue to have abdominal pain or do not have a normal bowel movement increase it to 2 capfuls a day. if do not start having normal bowel movements after 3 days may try magnesium citrate or other oral laxative. If still unsuccessful use a glycerin suppository. If this still does not work may use fleets enema over-the-counter. If continue to have issues with constipation, uncontrolled pain, or vomiting go to the ER.

## 2023-10-26 NOTE — ED INITIAL ASSESSMENT (HPI)
Patient c/o sinus pressure and left ear pain x 3 days. Also reports left lateral abd lower rib discomfort. PMD advised patient \"constipation\" had relief with miralax of constipation but intermittent pain continues primarily after eating.

## 2023-12-17 NOTE — ED INITIAL ASSESSMENT (HPI)
Black and red bloody stool for a few weeks - now more frequent; hx of hemorrhoids    LUQ pain , worsens after eating, now more burning pain all over off and on for months, worsening x 2-3 weeks    15lb unintentional wt loss in last 2-3 months    Taking famotidine, tums    No fever/vom

## 2024-01-11 ENCOUNTER — HOSPITAL ENCOUNTER (OUTPATIENT)
Age: 29
Discharge: HOME | End: 2024-01-11
Attending: EMERGENCY MEDICINE
Payer: MEDICAID

## 2024-01-11 VITALS
HEART RATE: 75 BPM | HEIGHT: 72 IN | RESPIRATION RATE: 18 BRPM | DIASTOLIC BLOOD PRESSURE: 77 MMHG | BODY MASS INDEX: 30.48 KG/M2 | SYSTOLIC BLOOD PRESSURE: 141 MMHG | OXYGEN SATURATION: 99 % | TEMPERATURE: 97 F | WEIGHT: 225 LBS

## 2024-01-11 DIAGNOSIS — H66.90 ACUTE OTITIS MEDIA, UNSPECIFIED OTITIS MEDIA TYPE: Primary | ICD-10-CM

## 2024-01-11 PROCEDURE — 99213 OFFICE O/P EST LOW 20 MIN: CPT

## 2024-01-11 RX ORDER — DOXYCYCLINE HYCLATE 100 MG/1
100 CAPSULE ORAL 2 TIMES DAILY
Qty: 20 CAPSULE | Refills: 0 | Status: SHIPPED | OUTPATIENT
Start: 2024-01-11 | End: 2024-01-21

## 2024-01-11 NOTE — ED INITIAL ASSESSMENT (HPI)
Dr. Arenas at the bedside assessing. Patient here for evaluation of right ear pain and drainage x 4 days. States some sinus congestion but has tested negative for COVID at home.

## 2024-01-11 NOTE — ED PROVIDER NOTES
Patient Seen in: [facility]      History     Chief Complaint   Patient presents with    Ear Problem Pain     Stated Complaint: ear pain    Subjective:   HPI    Patient is a 28-year-old patient presents emergency room with a history of right-sided ear pain and drainage which is been ongoing intermittently for last 4 days.  The patient has had other people that have been sick at home with upper respiratory symptoms tested negative for COVID.  Patient denies history of any vomiting or diarrhea.  The patient denies history of any fall or trauma to the ear.  Patient denies history of any other somatic complaints or discomfort at this time.  Past Medical History:   Diagnosis Date    Acid reflux     Spermatocele               Past Surgical History:   Procedure Laterality Date    OTHER      lipoma removal, L upper shoulder    OTHER      wisdom teeth     OTHER      lipoma removed from neck in 2016                Social History     Socioeconomic History    Marital status:    Tobacco Use    Smoking status: Former    Smokeless tobacco: Never    Tobacco comments:     only for 3 months in 2017   Vaping Use    Vaping Use: Never used   Substance and Sexual Activity    Alcohol use: Yes     Alcohol/week: 6.0 standard drinks of alcohol     Types: 6 Standard drinks or equivalent per week     Comment: socially    Drug use: Not Currently     Frequency: 3.0 times per week     Types: Cannabis     Comment: last use: 9/18/20   Other Topics Concern    Caffeine Concern Yes     Comment: 2 can of coke every other day.     Exercise Yes     Comment: Takes long walks 3 times weekly.    Social History Narrative    Single    Work- [employer]-     Grad student.               Review of Systems    Positive for stated complaint: ear pain  Other systems are as noted in HPI.  Constitutional and vital signs reviewed.      All other systems reviewed and negative except as noted above.    Physical Exam     ED Triage  Vitals [01/11/24 1128]   /77   Pulse 75   Resp 18   Temp 97.2 °F (36.2 °C)   Temp src Temporal   SpO2 99 %   O2 Device None (Room air)       Current:/77   Pulse 75   Temp 97.2 °F (36.2 °C) (Temporal)   Resp 18   Ht 185.4 cm (6' 1\")   Wt 102.1 kg   SpO2 99%   BMI 29.69 kg/m²         Physical Exam  GENERAL: Well-developed, well-nourished patient sitting up breathing easily in no apparent distress.  Patient is nontoxic in appearance.  HEENT: Head is normocephalic, atraumatic. Pupils are 4 mm equally round and reactive to light. Oropharynx is clear. Mucous membranes are moist.  Left tympanic membrane is negative.  Right tympanic membrane shows mild erythema and dullness to landmarks consistent with otitis media.  NECK: . No stridor.  LUNGS: Clear to auscultation bilaterally with no wheeze. There is good equal air entry bilaterally.  HEART: Regular rate and rhythm. Normal S1, S2 no S3, or S4. No murmur.  NEURO: Patient is awake, alert and oriented to time place and person. Motor strength is 5 over 5 in all 4 extremities. There are no gross motor or sensory deficits appreciated.  Patient up and ambulatory in the immediate care with a steady gait and no ataxia.           ED Course   Labs Reviewed - No data to display                   MDM     Patient be treated with antibiotics for home instructions to take Tylenol and Motrin for symptoms at home return to the ER immediately if symptoms worsen or if any other problems arise.  Patient discharged home at this time.                                   Medical Decision Making      Disposition and Plan     Clinical Impression:  1. Acute otitis media, unspecified otitis media type         Disposition:  Discharge  1/11/2024 11:40 am    Follow-up:  Karmen Keller, ADA  [address] [facility] [address]  [phone]    In 2 days            Medications Prescribed:  Discharge Medication List as of 1/11/2024 11:43 AM        START taking these medications     Details   doxycycline 100 MG Oral Cap Take 1 capsule (100 mg total) by mouth 2 (two) times daily for 10 days., Normal, Disp-20 capsule, R-0

## 2024-01-16 ENCOUNTER — LAB ENCOUNTER (OUTPATIENT)
Dept: LAB | Age: 29
End: 2024-01-16
Attending: INTERNAL MEDICINE
Payer: MEDICAID

## 2024-01-16 ENCOUNTER — TELEPHONE (OUTPATIENT)
Dept: GASTROENTEROLOGY | Facility: CLINIC | Age: 29
End: 2024-01-16

## 2024-01-16 ENCOUNTER — OFFICE VISIT (OUTPATIENT)
Dept: GASTROENTEROLOGY | Facility: CLINIC | Age: 29
End: 2024-01-16

## 2024-01-16 VITALS
BODY MASS INDEX: 30.88 KG/M2 | WEIGHT: 228 LBS | DIASTOLIC BLOOD PRESSURE: 80 MMHG | SYSTOLIC BLOOD PRESSURE: 126 MMHG | HEIGHT: 72 IN

## 2024-01-16 DIAGNOSIS — K21.9 GASTROESOPHAGEAL REFLUX DISEASE, UNSPECIFIED WHETHER ESOPHAGITIS PRESENT: ICD-10-CM

## 2024-01-16 DIAGNOSIS — K21.9 GASTROESOPHAGEAL REFLUX DISEASE, UNSPECIFIED WHETHER ESOPHAGITIS PRESENT: Primary | ICD-10-CM

## 2024-01-16 DIAGNOSIS — R10.84 GENERALIZED ABDOMINAL PAIN: Primary | ICD-10-CM

## 2024-01-16 DIAGNOSIS — R10.84 GENERALIZED ABDOMINAL PAIN: ICD-10-CM

## 2024-01-16 DIAGNOSIS — R10.13 EPIGASTRIC PAIN: ICD-10-CM

## 2024-01-16 DIAGNOSIS — R10.12 LUQ PAIN: ICD-10-CM

## 2024-01-16 DIAGNOSIS — K58.1 IRRITABLE BOWEL SYNDROME WITH CONSTIPATION: ICD-10-CM

## 2024-01-16 DIAGNOSIS — K82.4 GALLBLADDER POLYP: ICD-10-CM

## 2024-01-16 LAB — IGA SERPL-MCNC: 236 MG/DL (ref 70–312)

## 2024-01-16 PROCEDURE — 99204 OFFICE O/P NEW MOD 45 MIN: CPT | Performed by: INTERNAL MEDICINE

## 2024-01-16 PROCEDURE — 86364 TISS TRNSGLTMNASE EA IG CLAS: CPT

## 2024-01-16 PROCEDURE — 3074F SYST BP LT 130 MM HG: CPT | Performed by: INTERNAL MEDICINE

## 2024-01-16 PROCEDURE — 82784 ASSAY IGA/IGD/IGG/IGM EACH: CPT

## 2024-01-16 PROCEDURE — 3008F BODY MASS INDEX DOCD: CPT | Performed by: INTERNAL MEDICINE

## 2024-01-16 PROCEDURE — 36415 COLL VENOUS BLD VENIPUNCTURE: CPT

## 2024-01-16 PROCEDURE — 3079F DIAST BP 80-89 MM HG: CPT | Performed by: INTERNAL MEDICINE

## 2024-01-16 RX ORDER — LINACLOTIDE 72 UG/1
72 CAPSULE, GELATIN COATED ORAL DAILY
Qty: 90 CAPSULE | Refills: 2 | Status: SHIPPED | OUTPATIENT
Start: 2024-01-16 | End: 2024-01-19

## 2024-01-16 RX ORDER — OMEPRAZOLE 40 MG/1
40 CAPSULE, DELAYED RELEASE ORAL
Qty: 180 CAPSULE | Refills: 0 | Status: SHIPPED | OUTPATIENT
Start: 2024-01-16 | End: 2024-04-15

## 2024-01-16 NOTE — PATIENT INSTRUCTIONS
1. Schedule an upper endoscopy (EGD) with MAC [Diagnosis: acid reflux, epigastric and LUQ pain]    2. Do not eat or drink after midnight the night before your procedure.     3. Medication adjustments: Continue ALL medications as usual.    4. If you start any NEW medication after your visit today, please notify us. Certain medications will need to be held before the procedure, or the procedure cannot be performed.     5. You will need a ride home from your procedure since you are receiving sedation. Please ensure you will have an available ride home or the procedure cannot be performed.

## 2024-01-16 NOTE — TELEPHONE ENCOUNTER
Scheduled for:  EGD 30209  Provider Name:  Dr. Enciso  Date:  1/19/2024  Location:  [facility]  Sedation:  MAC  Time:  8:45 am, arrival 7:45 am  Prep:  NPO after midnight  Meds/Allergies Reconciled?:  Physician reviewed  Diagnosis with codes:  GERD K21.9; Epigastric pain R10.13; LUQ pain R10.12  Was patient informed to call insurance with codes (Y/N):  Yes  Referral sent?:  Referral was sent at the time of electronic surgical scheduling.  EM or [facility] notified?:  I sent an electronic request to Endo Scheduling and received a confirmation today.    Medication Orders:  N/A  Misc Orders:  N/A     Further instructions given by staff:  Prep instructions were given to pt in the office, pt verbalized understanding.

## 2024-01-16 NOTE — H&P
[facility] - Gastroenterology                                                                                                               Reason for consult: abdominal pain, dark stool     Requesting physician or provider: ADA MARAVILLA    Chief Complaint   Patient presents with    Consult     Stomach pain; has seen blood in stool; was taking miralax; IC treated for ulcer - omeprazole & carafate - both helped; changed diet         HPI:   Re Jackson is a 28 year old year-old transgender woman on HRT presenting for abdominal pain and dark stools.     She describes abdominal pain to primarily her left side that is worse after eating. This started in 08/2023. Sometimes pain will also radiate to RUQ. She notes this is worse after eating fatty foods. She had a period of time where she felt like she could not eat anything due to pain. She was seen in IC clinic and prescribed sucralfate and omeprazole. She also made dietary changes, including cutting down on acidic foods and cutting out caffeine. She now is on omeprazole and pepcid and feels she can tolerate PO intake, but only small meals. She endorses heartburn and reflux. She also has noted stool coated in tarry black flecks. This has resolved since starting PPI. She denies dysphagia and n/v. She notes that pain will improve with defecation.     She had h pylori testing that was negative. Recent Hb normal. Abdominal US with 5 mm GB polyp, no gallstones, no cholecystitis.     She previously saw [facility] for rectal bleeding. Colonoscopy with int hemorrhoids. Bleeding was attributed to constipation in the setting of hemorrhoids. She has been having BMs every day for the past few weeks. Previously, was having BMs every few days on 1-2 capfuls of miralax daily. She is not currently on a bowel regimen.     Prior endoscopies:  Colonoscopy 01/2023:   FINDINGS:  The  terminal ileum was intubated and appeared normal.   The cecum appeared normal.  The ascending colon appeared normal.  The transverse colon appeared normal.   The descending colon appeared normal.  The sigmoid colon appeared normal.  The colonoscope was then withdrawn into the rectum and retroflexed revealing internal hemorrhoids.    Withdrawal time: 11 minutes    RECOMMENDATIONS:    1. Fiber supplement  2. RTC if bleeding persists     Soc:  -No smoking  -No EtOH  -No illicits, THC     Wt Readings from Last 6 Encounters:   01/16/24 225 lb (102.1 kg)   01/16/24 228 lb (103.4 kg)   01/11/24 225 lb (102.1 kg)   12/17/23 225 lb (102.1 kg)   10/26/23 240 lb (108.9 kg)   08/15/23 248 lb (112.5 kg)        History, Medications, Allergies, ROS:      Past Medical History:   Diagnosis Date    Acid reflux     Esophageal reflux     Spermatocele       Past Surgical History:   Procedure Laterality Date    OTHER      lipoma removal, L upper shoulder    OTHER      wisdom teeth     OTHER      lipoma removed from neck in 2016      Family Hx:   Family History   Problem Relation Age of Onset    Asthma Mother     Anxiety Mother     Depression Father         SSRI    Hypertension Father     Other (sleep apnea) Father     Cancer Maternal Grandmother     Alcohol and Other Disorders Associated Maternal Grandmother     Cancer Maternal Grandfather     Alcohol and Other Disorders Associated Maternal Grandfather     Diabetes Paternal Grandfather     Heart Disorder Paternal Grandfather     OCD Sister     Depression Brother       Social History:   Social History     Socioeconomic History    Marital status:    Tobacco Use    Smoking status: Former    Smokeless tobacco: Never    Tobacco comments:     only for 3 months in 2017   Vaping Use    Vaping Use: Never used   Substance and Sexual Activity    Alcohol use: Not Currently     Alcohol/week: 6.0 standard drinks of alcohol     Types: 6 Standard drinks or equivalent per week     Comment:  socially    Drug use: Yes     Frequency: 3.0 times per week     Types: Cannabis   Other Topics Concern    Caffeine Concern Yes     Comment: 2 can of coke every other day.     Exercise Yes     Comment: Takes long walks 3 times weekly.    Social History Narrative    Single    Work- [employer]-     Grad student.         Medications (Active prior to today's visit):  Current Outpatient Medications   Medication Sig Dispense Refill    Probiotic Product (PROBIOTIC DAILY OR) Take by mouth.      linaCLOtide (LINZESS) 72 MCG Oral Cap Take 72 mcg by mouth daily. 90 capsule 2    Omeprazole 40 MG Oral Capsule Delayed Release Take 1 capsule (40 mg total) by mouth 2 (two) times daily before meals. 180 capsule 0    doxycycline 100 MG Oral Cap Take 1 capsule (100 mg total) by mouth 2 (two) times daily for 10 days. 20 capsule 0    famotidine 20 MG Oral Tab Take 1 tablet (20 mg total) by mouth 2 (two) times daily.      pantoprazole 40 MG Oral Tab EC Take 1 tablet (40 mg total) by mouth daily. 30 tablet 0    Cetirizine HCl (ZYRTEC OR) Take by mouth as needed.      Progesterone Micronized 100 MG Oral Cap Take 1 capsule (100 mg total) by mouth daily.      spironolactone 100 MG Oral Tab Take 1 tablet (100 mg total) by mouth daily.      estradiol 2 MG Oral Tab Take 3 tablets (6 mg total) by mouth daily.      Melatonin 10 MG Oral Cap Take 1 tablet by mouth nightly as needed.      Acetaminophen (TYLENOL OR)  (Patient not taking: Reported on 12/17/2023)      IBUPROFEN OR  (Patient not taking: Reported on 12/17/2023)      Emtricitabine-Tenofovir DF (TRUVADA OR) Take by mouth. (Patient not taking: Reported on 12/17/2023)         Allergies:  Allergies   Allergen Reactions    Dander HIVES     Dogs & cats. Some dog breeds = Hives; but usually, just sneezing       ROS:   CONSTITUTIONAL:  negative for fevers, rigors  EYES:  negative for diplopia   RESPIRATORY:  negative for severe shortness of breath  CARDIOVASCULAR:  negative for  crushing sub-sternal chest pain  GASTROINTESTINAL:  see HPI  GENITOURINARY:  negative for dysuria or gross hematuria  INTEGUMENT/BREAST:  SKIN:  negative for jaundice   ALLERGIC/IMMUNOLOGIC:  negative for hay fever  ENDOCRINE:  negative for cold intolerance and heat intolerance  MUSCULOSKELETAL:  negative for joint effusion/severe erythema  BEHAVIOR/PSYCH:  negative for psychotic behavior    PHYSICAL EXAM:   Blood pressure 126/80, height 6' 1\" (1.854 m), weight 228 lb (103.4 kg).    Gen: appears comfortable and in no acute distress  HEENT: sclera appear anicteric, oropharynx clear, mucus membranes appear moist  CV: the extremities are warm and well-perfused   Lung: no increased work of breathing, no conversational dyspnea   Abd: soft, non-tender exam in all quadrants without rigidity or guarding, non-distended, no masses palpated  Skin: no jaundice, no apparent rashes   Neuro: alert and interactive, no focal neuro deficits  Psych: cooperative, normal affect     Labs/Imaging:     Patient's pertinent labs and imaging were reviewed and discussed with patient today.      ASSESSMENT/PLAN:   Re Jackson is a 28 year old year-old transgender woman on HRT presenting for abdominal pain and dark stools.     She presents today with significant abdominal pain with reflux and heartburn. Previously was unable to tolerate PO intake due to pain. She was started on PPI and sucralfate with improvement in sx, but still only able to tolerate small meals. She also notes that stool was coated in black tarry substance back when sx were severe about 1 month ago. Recommended she increase PPI to BID dosing and undergo EGD to evaluate pain and possible melena. Recent labs with normal Hb and h pylori negative.     She had recent abd US without cholelithiasis or cholecystitis. US was notable for small GB polyp measuring 5 mm. Will discuss repeat US for surveillance after EGD.     Will also start linzess for constipation. Addendum 1/18/24  5:13PM - She has previously failed over-the-counter regimens including fiber supplementation, stool softeners, suppositories/enemas, and osmotic laxatives.     Recommendations:  1. Schedule an upper endoscopy (EGD) with MAC [Diagnosis: acid reflux, epigastric and LUQ pain]    2. Do not eat or drink after midnight the night before your procedure.     3. Medication adjustments: Continue ALL medications as usual.    4. If you start any NEW medication after your visit today, please notify us. Certain medications will need to be held before the procedure, or the procedure cannot be performed.     5. You will need a ride home from your procedure since you are receiving sedation. Please ensure you will have an available ride home or the procedure cannot be performed.           Orders This Visit:  Orders Placed This Encounter   Procedures    Immunoglobulin A, Quant    Tissue Transglutaminase Ab, IgA       Meds This Visit:  Requested Prescriptions     Signed Prescriptions Disp Refills    linaCLOtide (LINZESS) 72 MCG Oral Cap 90 capsule 2     Sig: Take 72 mcg by mouth daily.    Omeprazole 40 MG Oral Capsule Delayed Release 180 capsule 0     Sig: Take 1 capsule (40 mg total) by mouth 2 (two) times daily before meals.       Imaging & Referrals:  None       Belem Enciso MD  [facility]enter[facility]  1/16/2024

## 2024-01-17 LAB — TTG IGA SER-ACNC: 0.6 U/ML (ref ?–7)

## 2024-01-18 ENCOUNTER — TELEPHONE (OUTPATIENT)
Facility: CLINIC | Age: 29
End: 2024-01-18

## 2024-01-18 NOTE — TELEPHONE ENCOUNTER
Pt states that Linzess will need prior auth per pharmacy.  Please call.        Current Outpatient Medications:       linaCLOtide (LINZESS) 72 MCG Oral Cap, Take 72 mcg by mouth daily., Disp: 90 capsule, Rfl: 2

## 2024-01-19 ENCOUNTER — HOSPITAL ENCOUNTER (OUTPATIENT)
Age: 29
Setting detail: HOSPITAL OUTPATIENT SURGERY
Discharge: HOME | End: 2024-01-19
Attending: INTERNAL MEDICINE | Admitting: INTERNAL MEDICINE
Payer: MEDICAID

## 2024-01-19 ENCOUNTER — ANESTHESIA EVENT (OUTPATIENT)
Dept: ENDOSCOPY | Age: 29
End: 2024-01-19
Payer: MEDICAID

## 2024-01-19 ENCOUNTER — ANESTHESIA (OUTPATIENT)
Dept: ENDOSCOPY | Age: 29
End: 2024-01-19
Payer: MEDICAID

## 2024-01-19 VITALS
HEIGHT: 72 IN | OXYGEN SATURATION: 100 % | HEART RATE: 62 BPM | WEIGHT: 225 LBS | RESPIRATION RATE: 10 BRPM | SYSTOLIC BLOOD PRESSURE: 124 MMHG | TEMPERATURE: 98 F | BODY MASS INDEX: 30.48 KG/M2 | DIASTOLIC BLOOD PRESSURE: 75 MMHG

## 2024-01-19 DIAGNOSIS — K31.89 RETAINED FOOD IN STOMACH: Primary | ICD-10-CM

## 2024-01-19 DIAGNOSIS — R10.13 EPIGASTRIC PAIN: ICD-10-CM

## 2024-01-19 DIAGNOSIS — R10.12 LUQ PAIN: ICD-10-CM

## 2024-01-19 DIAGNOSIS — K21.9 GASTROESOPHAGEAL REFLUX DISEASE, UNSPECIFIED WHETHER ESOPHAGITIS PRESENT: ICD-10-CM

## 2024-01-19 PROCEDURE — 99070 SPECIAL SUPPLIES PHYS/QHP: CPT | Performed by: INTERNAL MEDICINE

## 2024-01-19 PROCEDURE — 43239 EGD BIOPSY SINGLE/MULTIPLE: CPT | Performed by: INTERNAL MEDICINE

## 2024-01-19 PROCEDURE — 88312 SPECIAL STAINS GROUP 1: CPT | Performed by: INTERNAL MEDICINE

## 2024-01-19 PROCEDURE — 88305 TISSUE EXAM BY PATHOLOGIST: CPT | Performed by: INTERNAL MEDICINE

## 2024-01-19 RX ORDER — LIDOCAINE HYDROCHLORIDE 10 MG/ML
INJECTION, SOLUTION EPIDURAL; INFILTRATION; INTRACAUDAL; PERINEURAL AS NEEDED
Status: DISCONTINUED | OUTPATIENT
Start: 2024-01-19 | End: 2024-01-19 | Stop reason: SURG

## 2024-01-19 RX ORDER — NALOXONE HYDROCHLORIDE 0.4 MG/ML
0.08 INJECTION, SOLUTION INTRAMUSCULAR; INTRAVENOUS; SUBCUTANEOUS ONCE AS NEEDED
Status: DISCONTINUED | OUTPATIENT
Start: 2024-01-19 | End: 2024-01-19

## 2024-01-19 RX ORDER — SODIUM CHLORIDE, SODIUM LACTATE, POTASSIUM CHLORIDE, CALCIUM CHLORIDE 600; 310; 30; 20 MG/100ML; MG/100ML; MG/100ML; MG/100ML
INJECTION, SOLUTION INTRAVENOUS CONTINUOUS
Status: DISCONTINUED | OUTPATIENT
Start: 2024-01-19 | End: 2024-01-19

## 2024-01-19 RX ORDER — EMTRICITABINE AND TENOFOVIR DISOPROXIL FUMARATE 200; 300 MG/1; MG/1
1 TABLET, FILM COATED ORAL DAILY
COMMUNITY

## 2024-01-19 RX ORDER — LINACLOTIDE 72 UG/1
72 CAPSULE, GELATIN COATED ORAL DAILY
Qty: 90 CAPSULE | Refills: 2 | Status: SHIPPED | OUTPATIENT
Start: 2024-01-19 | End: 2024-02-18

## 2024-01-19 RX ADMIN — LIDOCAINE HYDROCHLORIDE 100 MG: 10 INJECTION, SOLUTION EPIDURAL; INFILTRATION; INTRACAUDAL; PERINEURAL at 08:23:00

## 2024-01-19 RX ADMIN — SODIUM CHLORIDE, SODIUM LACTATE, POTASSIUM CHLORIDE, CALCIUM CHLORIDE: 600; 310; 30; 20 INJECTION, SOLUTION INTRAVENOUS at 08:20:00

## 2024-01-19 NOTE — DISCHARGE INSTRUCTIONS
Home Care Instructions for Gastroscopy with Sedation    Diet:  - Resume your regular diet as tolerated unless otherwise instructed.  - Start with light meals to minimize bloating.  - Do not drink alcohol today.    Medication:  - If you have questions about resuming your normal medications, please contact your Primary Care Physician.    Activities:  - Take it easy today. Do not return to work today.  - Do not drive today.  - Do not operate any machinery today (including kitchen equipment).    Gastroscopy:  - You may have a sore throat for 2-3 days following the exam. This is normal. Gargling with warm salt water (1/2 tsp salt to 1 glass warm water) or using throat lozenges will help.  - If you experience any sharp pain in your neck, abdomen or chest, vomiting of blood, oral temperature over 100 degrees Fahrenheit, light-headedness or dizziness, or any other problems, contact your doctor.    **If unable to reach your doctor, please go to the [facility] Emergency Room**    - Your referring physician will receive a full report of your examination.  - If you do not hear from your doctor's office within two weeks of your biopsy, please call them for your results.    You may be able to see your laboratory results in [employer] between 4 and 7 business days.  In some cases, your physician may not have viewed the results before they are released to [employer].  If you have questions regarding your results contact the physician who ordered the test/exam by phone or via [employer] by choosing \"Ask a Medical Question.\"

## 2024-01-19 NOTE — TELEPHONE ENCOUNTER
Linzess approved    Changed pharmacy on order to [facility]  Spoke to patient and let her know that it was approved and gave her information regarding [facility].    Just MARILYN Patricia

## 2024-01-19 NOTE — ANESTHESIA POSTPROCEDURE EVALUATION
Patient: Re Jackson    Procedure Summary       Date: 01/19/24 Room / Location: [facility] ENDOSCOPY 02 / [facility] ENDO    Anesthesia Start: 0820 Anesthesia Stop:     Procedure: ESOPHAGOGASTRODUODENOSCOPY (EGD) Diagnosis:       Gastroesophageal reflux disease, unspecified whether esophagitis present      Epigastric pain      LUQ pain      (retained food; otherwise normal)    Surgeons: Belem Enciso MD Anesthesiologist:     Anesthesia Type: MAC ASA Status: 2            Anesthesia Type: MAC    Vitals Value Taken Time   /73 01/19/24 0831   Temp 98 °F (36.7 °C) 01/19/24 0831   Pulse 70 01/19/24 0831   Resp 14 01/19/24 0831   SpO2 97 % 01/19/24 0831   Vitals shown include unfiled device data.    [facility] AN Post Evaluation:   Patient Evaluated in PACU  Patient Participation: complete - patient participated  Level of Consciousness: sleepy but conscious  Pain Score: 0  Pain Management: adequate  Airway Patency:patent  Dental exam unchanged from preop  Yes    Cardiovascular Status: stable and acceptable  Respiratory Status: acceptable and room air  Postoperative Hydration acceptable      RADHIKA VELA CRNA  1/19/2024 8:32 AM

## 2024-01-19 NOTE — H&P
History & Physical Examination    Patient Name: Re Jackson  MRN: [id number]  [facility]: [id number]  YOB: 1995    Diagnosis: Abdominal pain, EGD today    Medications Prior to Admission   Medication Sig Dispense Refill Last Dose    Probiotic Product (PROBIOTIC DAILY OR) Take by mouth.       Omeprazole 40 MG Oral Capsule Delayed Release Take 1 capsule (40 mg total) by mouth 2 (two) times daily before meals. 180 capsule 0     doxycycline 100 MG Oral Cap Take 1 capsule (100 mg total) by mouth 2 (two) times daily for 10 days. 20 capsule 0     famotidine 20 MG Oral Tab Take 1 tablet (20 mg total) by mouth 2 (two) times daily.    at prn    Cetirizine HCl (ZYRTEC OR) Take by mouth as needed.       Progesterone Micronized 100 MG Oral Cap Take 1 capsule (100 mg total) by mouth daily.       spironolactone 100 MG Oral Tab Take 1 tablet (100 mg total) by mouth daily.       estradiol 2 MG Oral Tab Take 3 tablets (6 mg total) by mouth daily.       Melatonin 10 MG Oral Cap Take 1 tablet by mouth nightly as needed.       linaCLOtide (LINZESS) 72 MCG Oral Cap Take 72 mcg by mouth daily. 90 capsule 2     [] pantoprazole 40 MG Oral Tab EC Take 1 tablet (40 mg total) by mouth daily. 30 tablet 0     [] sucralfate (CARAFATE) 1 g Oral Tab Take 1 tablet (1 g total) by mouth 4 (four) times daily before meals and nightly for 7 days. 28 tablet 0     Acetaminophen (TYLENOL OR)  (Patient not taking: Reported on 2023)       IBUPROFEN OR  (Patient not taking: Reported on 2023)       Emtricitabine-Tenofovir DF (TRUVADA OR) Take by mouth. (Patient not taking: Reported on 2023)        Current Facility-Administered Medications   Medication Dose Route Frequency    lactated ringers infusion   Intravenous Continuous       Allergies:   Allergies   Allergen Reactions    Dander HIVES     Dogs & cats. Some dog breeds = Hives; but usually, just sneezing       Past Medical History:   Diagnosis Date    Acid reflux      Esophageal reflux     Spermatocele      Past Surgical History:   Procedure Laterality Date    OTHER      lipoma removal, L upper shoulder    OTHER      wisdom teeth     OTHER      lipoma removed from neck in 2016     Family History   Problem Relation Age of Onset    Asthma Mother     Anxiety Mother     Depression Father         SSRI    Hypertension Father     Other (sleep apnea) Father     Cancer Maternal Grandmother     Alcohol and Other Disorders Associated Maternal Grandmother     Cancer Maternal Grandfather     Alcohol and Other Disorders Associated Maternal Grandfather     Diabetes Paternal Grandfather     Heart Disorder Paternal Grandfather     OCD Sister     Depression Brother      Social History     Tobacco Use    Smoking status: Former    Smokeless tobacco: Never    Tobacco comments:     only for 3 months in 2017   Substance Use Topics    Alcohol use: Not Currently     Alcohol/week: 6.0 standard drinks of alcohol     Types: 6 Standard drinks or equivalent per week     Comment: socially       SYSTEM Check if Review is Normal Check if Physical Exam is Normal If not normal, please explain:   HEENT [X ] [ X]    NECK  [X ] [ X]    HEART [X ] [ X]    LUNGS [X ] [ X]    ABDOMEN [X ] [ X]    EXTREMITIES [X ] [ X]    OTHER        I have discussed the risks and benefits and alternatives of the procedure with the patient/family.  They understand and agree to proceed with plan of care.   I have reviewed the History and Physical done within the last 30 days.  Any changes noted above.  Belem Enciso MD  [facility] Gastroenter[facility]

## 2024-01-19 NOTE — OPERATIVE REPORT
ESOPHAGOGASTRODUODENOSCOPY (EGD) REPORT    Re Jackson     1995 Age 28 year old   PCP ADA MARAVILLA Endoscopist Belem Enciso MD       Date of procedure: 24    Procedure: EGD w/ biopsies     Pre-operative diagnosis: epigastric and LUQ abdominal pain    Post-operative diagnosis: retained food in stomach    Medications: MAC    Complications: none    Procedure:  Informed consent was obtained from the patient after the risks of the procedure were discussed, including but not limited to bleeding, perforation, aspiration, infection, or possibility of a missed lesion. After discussions of the risks/benefits and alternatives to this procedure, as well as the planned sedation, the patient was placed in the left lateral decubitus position and begun on continuous blood pressure pulse oximetry and EKG monitoring and this was maintained throughout the procedure. Once an adequate level of sedation was obtained a bite block was placed. Then the lubricated tip of the [id number] diagnostic video upper endoscope was inserted and advanced using direct visualization into the posterior pharynx and ultimately into the esophagus. The scope was then advanced through the stomach before procedure was aborted due to retained food.     Complications: None    Findings:      1. Esophagus: The squamocolumnar junction was noted at 38 cm and appeared regular. The diaphragmatic pinch was noted noted at 38 cm from the incisors. The esophageal mucosa appeared normal. There was no endoscopic findings of esophagitis, stricture or Ojeda's esophagus.    2. Stomach: There was a large amount of retained food in the body of the stomach extending to the antrum. The limited views of the gastric mucosa appeared normal. Biopsies obtained. Further examination of the stomach and duodenum were deferred to prevent aspiration.     Impression:  -Esophagus: Normal.   -Stomach: Large amount of retained food. Biopsied.    -Examination of the stomach limited due to retained food and further examination of distal stomach and duodenum were deferred to prevent aspiration.     Recommend:  1. Await pathology.   2. Complete a NM gastric emptying study.   3. Eat small, frequent meals. Avoid high carbohydrate and high fat loads.   4. Continue omeprazole twice a day.   5. Repeat EGD.     >>>If tissue was sampled/removed and you have not received your pathology results either by phone or letter within 2 weeks, please call our office at [phone].    Specimens:  Gastric biopsies     Blood loss: <1 ml

## 2024-01-19 NOTE — ANESTHESIA PREPROCEDURE EVALUATION
Anesthesia PreOp Note    HPI:     Re Jackson is a 28 year old adult who presents for preoperative consultation requested by: Belem Enciso MD    Date of Surgery: 1/19/2024    Procedure(s):  ESOPHAGOGASTRODUODENOSCOPY (EGD)  Indication: Gastroesophageal reflux disease, unspecified whether esophagitis present / Epigastric pain / LUQ pain    Relevant Problems   No relevant active problems       NPO:  Last Liquid Consumption Date: 01/19/24  Last Liquid Consumption Time: 0600 (water)  Last Solid Consumption Date: 01/18/24  Last Solid Consumption Time: 2300  Last Liquid Consumption Date: 01/19/24          History Review:  Patient Active Problem List    Diagnosis Date Noted    Right groin pain 09/09/2021    Costochondritis 08/05/2019    Complicated migraine 07/03/2019    Abnormal CT of the chest 06/21/2019    Right kidney stone 06/21/2019    Cyst of right kidney 06/21/2019    Acute mesenteric adenitis 06/21/2019    Umbilical hernia without obstruction and without gangrene 06/21/2019    Acid reflux     Lipoma of back 01/03/2017    Other allergy, other than to medicinal agents 06/29/2012       Past Medical History:   Diagnosis Date    Acid reflux     Esophageal reflux     Spermatocele        Past Surgical History:   Procedure Laterality Date    OTHER      lipoma removal, L upper shoulder    OTHER      wisdom teeth     OTHER      lipoma removed from neck in 2016       Medications Prior to Admission   Medication Sig Dispense Refill Last Dose    emtricitabine-tenofovir 200-300 MG Oral Tab Take 1 tablet by mouth daily.   1/18/2024 at 2230    Probiotic Product (PROBIOTIC DAILY OR) Take by mouth.   1/18/2024 at 2230    Omeprazole 40 MG Oral Capsule Delayed Release Take 1 capsule (40 mg total) by mouth 2 (two) times daily before meals. 180 capsule 0 1/18/2024 at 2230    doxycycline 100 MG Oral Cap Take 1 capsule (100 mg total) by mouth 2 (two) times daily for 10 days. 20 capsule 0 1/18/2024 at 2230     famotidine 20 MG Oral Tab Take 1 tablet (20 mg total) by mouth 2 (two) times daily.   2024 at 1700    Cetirizine HCl (ZYRTEC OR) Take by mouth as needed.   2024    Progesterone Micronized 100 MG Oral Cap Take 1 capsule (100 mg total) by mouth daily.   2024 at 2230    spironolactone 100 MG Oral Tab Take 1 tablet (100 mg total) by mouth daily.   2024 at 2230    estradiol 2 MG Oral Tab Take 3 tablets (6 mg total) by mouth daily.   2024 at 2230    Melatonin 10 MG Oral Cap Take 1 tablet by mouth nightly as needed.   2024 at 2230    linaCLOtide (LINZESS) 72 MCG Oral Cap Take 72 mcg by mouth daily. 90 capsule 2 havent started    [] pantoprazole 40 MG Oral Tab EC Take 1 tablet (40 mg total) by mouth daily. 30 tablet 0     [] sucralfate (CARAFATE) 1 g Oral Tab Take 1 tablet (1 g total) by mouth 4 (four) times daily before meals and nightly for 7 days. 28 tablet 0     Acetaminophen (TYLENOL OR)  (Patient not taking: Reported on 2023)       IBUPROFEN OR  (Patient not taking: Reported on 2023)       Emtricitabine-Tenofovir DF (TRUVADA OR) Take by mouth. (Patient not taking: Reported on 2023)   2024 at 2230     Current Facility-Administered Medications Ordered in Epic   Medication Dose Route Frequency Provider Last Rate Last Admin    lactated ringers infusion   Intravenous Continuous Belem Enciso MD         No current [facility]-ordered outpatient medications on file.       Allergies   Allergen Reactions    Dander HIVES     Dogs & cats. Some dog breeds = Hives; but usually, just sneezing       Family History   Problem Relation Age of Onset    Asthma Mother     Anxiety Mother     Depression Father         SSRI    Hypertension Father     Other (sleep apnea) Father     Cancer Maternal Grandmother     Alcohol and Other Disorders Associated Maternal Grandmother     Cancer Maternal Grandfather     Alcohol and Other Disorders Associated Maternal Grandfather      Diabetes Paternal Grandfather     Heart Disorder Paternal Grandfather     OCD Sister     Depression Brother      Social History     Socioeconomic History    Marital status:    Tobacco Use    Smoking status: Former    Smokeless tobacco: Never    Tobacco comments:     only for 3 months in 2017   Vaping Use    Vaping Use: Never used   Substance and Sexual Activity    Alcohol use: Not Currently     Alcohol/week: 6.0 standard drinks of alcohol     Types: 6 Standard drinks or equivalent per week     Comment: socially    Drug use: Yes     Frequency: 3.0 times per week     Types: Cannabis   Other Topics Concern    Caffeine Concern Yes     Comment: 2 can of coke every other day.     Exercise Yes     Comment: Takes long walks 3 times weekly.        Available pre-op labs reviewed.  Lab Results   Component Value Date    MCV 85.2 12/17/2023    MCHC 33.3 12/17/2023             Vital Signs:  Body mass index is 29.69 kg/m².   height is 1.854 m (6' 1\") and weight is 102.1 kg (225 lb). Her blood pressure is 126/82 and her pulse is 76. Her respiration is 12 and oxygen saturation is 99%.   Vitals:    01/16/24 1252 01/19/24 0802   BP:  126/82   Pulse:  76   Resp:  12   SpO2:  99%   Weight: 102.1 kg (225 lb)    Height: 1.854 m (6' 1\")         Anesthesia Evaluation     Patient summary reviewed and Nursing notes reviewed    Airway   Mallampati: I  TM distance: >3 FB  Neck ROM: full  Dental - Dentition appears grossly intact     Pulmonary - negative ROS and normal exam   Cardiovascular - negative ROS and normal exam    Neuro/Psych - negative ROS     GI/Hepatic/Renal    (+) GERD    Endo/Other - negative ROS   Abdominal  - normal exam                 Anesthesia Plan:   ASA:  2  Plan:   MAC  Informed Consent Plan and Risks Discussed With:  Patient  Discussed plan with:  Surgeon      I have informed Re Jackson and/or legal guardian or family member of the nature of the anesthetic plan, benefits, risks including possible dental  damage if relevant, major complications, and any alternative forms of anesthetic management.   All of the patient's questions were answered to the best of my ability. The patient desires the anesthetic management as planned.  RADHIKA VELA CRNA  1/19/2024 8:18 AM  Present on Admission:  **None**

## 2024-01-23 ENCOUNTER — TELEPHONE (OUTPATIENT)
Facility: CLINIC | Age: 29
End: 2024-01-23

## 2024-01-23 NOTE — TELEPHONE ENCOUNTER
----- Message from Belem Enciso MD sent at 1/22/2024  9:30 PM CST -----  GI staff: please reschedule for EGD with MAC with me for evaluation of epigastric and LUQ pain.

## 2024-01-24 ENCOUNTER — PATIENT MESSAGE (OUTPATIENT)
Dept: GASTROENTEROLOGY | Facility: CLINIC | Age: 29
End: 2024-01-24

## 2024-01-24 DIAGNOSIS — R10.31 RIGHT LOWER QUADRANT PAIN: Primary | ICD-10-CM

## 2024-01-24 NOTE — TELEPHONE ENCOUNTER
Called patient - rescheduled EGD with Dr. Enciso for 4/19/2024.    Please refer to T.E dated 1/16/2024 for scheduling orders.    T.E closed.

## 2024-01-24 NOTE — TELEPHONE ENCOUNTER
From: Re Jackson  To: Belem Enciso  Sent: 1/24/2024 11:15 AM CST  Subject: Pain questions    Hi Dr. Enciso,    I have a question about the way my pain is developing. Since adopting a diet of regular small meals and avoiding fat and fiber, along either the pantoprazole, my stomach pain is greatly reduced, and laxatives have helped a lot with constipation as far as I can tell. However, in the last week or so, I've started having lower abdominal cramps and tenderness on my lower right side, particularly after bowel movements. I haven't experienced anything quite like it before, and its becoming more noticable as my stomach feels better. This seems counterintuitive. Can you advise me on how to address this issue? I want to make sure I've presented a comprehensive picture of my situation.    Many thanks,  Re

## 2024-01-25 ENCOUNTER — HOSPITAL ENCOUNTER (OUTPATIENT)
Dept: CT IMAGING | Facility: HOSPITAL | Age: 29
Discharge: HOME | End: 2024-01-25
Attending: INTERNAL MEDICINE
Payer: MEDICAID

## 2024-01-25 ENCOUNTER — TELEPHONE (OUTPATIENT)
Facility: CLINIC | Age: 29
End: 2024-01-25

## 2024-01-25 DIAGNOSIS — R10.31 RIGHT LOWER QUADRANT PAIN: ICD-10-CM

## 2024-01-25 LAB
CREAT BLD-MCNC: 0.7 MG/DL
EGFRCR SERPLBLD CKD-EPI 2021: 120 ML/MIN/1.73M2 (ref 60–?)

## 2024-01-25 PROCEDURE — 82565 ASSAY OF CREATININE: CPT

## 2024-01-25 PROCEDURE — 74177 CT ABD & PELVIS W/CONTRAST: CPT | Performed by: INTERNAL MEDICINE

## 2024-01-25 RX ORDER — IOHEXOL 350 MG/ML
100 INJECTION, SOLUTION INTRAVENOUS
Status: COMPLETED | OUTPATIENT
Start: 2024-01-25 | End: 2024-01-25

## 2024-01-25 RX ADMIN — IOHEXOL 100 ML: 350 INJECTION, SOLUTION INTRAVENOUS at 20:01:00

## 2024-01-25 NOTE — TELEPHONE ENCOUNTER
Preston calling from [employer] regards PA for CT, states regards HIPAA. States if can resend clinical regards gender for CT. Please call.     Case #: [id number]

## 2024-01-25 NOTE — TELEPHONE ENCOUNTER
Spoke to Elis GONZALES, the medical reviewer at [facility]    Confirmed patient name and gender with her  (Pt's name is different on insurance card)    CT AB/PELV APPROVED  PA# [id number]  Effective from January 25th, 2024 - March 10th, 2024

## 2024-01-26 NOTE — PROGRESS NOTES
CT reviewed - normal. Again with mild splenomegaly, stable. No findings to explain RLQ pain. Will try tylenol and reassess in 1 week.

## 2024-02-12 ENCOUNTER — HOSPITAL ENCOUNTER (OUTPATIENT)
Age: 29
Discharge: HOME | End: 2024-02-12
Payer: MEDICAID

## 2024-02-12 VITALS
HEIGHT: 72 IN | OXYGEN SATURATION: 100 % | SYSTOLIC BLOOD PRESSURE: 138 MMHG | RESPIRATION RATE: 18 BRPM | DIASTOLIC BLOOD PRESSURE: 87 MMHG | BODY MASS INDEX: 29.26 KG/M2 | HEART RATE: 94 BPM | WEIGHT: 216 LBS | TEMPERATURE: 98 F

## 2024-02-12 DIAGNOSIS — N48.1 BALANITIS: ICD-10-CM

## 2024-02-12 DIAGNOSIS — R30.0 DYSURIA: Primary | ICD-10-CM

## 2024-02-12 LAB
BILIRUB UR QL STRIP: NEGATIVE
CLARITY UR: CLEAR
COLOR UR: YELLOW
GLUCOSE UR STRIP-MCNC: NEGATIVE MG/DL
HGB UR QL STRIP: NEGATIVE
KETONES UR STRIP-MCNC: NEGATIVE MG/DL
NITRITE UR QL STRIP: NEGATIVE
PH UR STRIP: 8.5 [PH]
PROT UR STRIP-MCNC: NEGATIVE MG/DL
SP GR UR STRIP: 1.02
UROBILINOGEN UR STRIP-ACNC: <2 MG/DL

## 2024-02-12 PROCEDURE — 87591 N.GONORRHOEAE DNA AMP PROB: CPT | Performed by: PHYSICIAN ASSISTANT

## 2024-02-12 PROCEDURE — 99214 OFFICE O/P EST MOD 30 MIN: CPT

## 2024-02-12 PROCEDURE — 87077 CULTURE AEROBIC IDENTIFY: CPT | Performed by: PHYSICIAN ASSISTANT

## 2024-02-12 PROCEDURE — 87086 URINE CULTURE/COLONY COUNT: CPT | Performed by: PHYSICIAN ASSISTANT

## 2024-02-12 PROCEDURE — 87491 CHLMYD TRACH DNA AMP PROBE: CPT | Performed by: PHYSICIAN ASSISTANT

## 2024-02-12 PROCEDURE — 81002 URINALYSIS NONAUTO W/O SCOPE: CPT

## 2024-02-12 RX ORDER — PENICILLIN V POTASSIUM 500 MG/1
500 TABLET ORAL 4 TIMES DAILY
Qty: 28 TABLET | Refills: 0 | Status: SHIPPED | OUTPATIENT
Start: 2024-02-12 | End: 2024-02-19

## 2024-02-12 RX ORDER — CLOTRIMAZOLE 1 %
1 CREAM (GRAM) TOPICAL DAILY
Qty: 1 EACH | Refills: 0 | Status: SHIPPED | OUTPATIENT
Start: 2024-02-12

## 2024-02-12 NOTE — DISCHARGE INSTRUCTIONS
Please return to the ER/clinic if symptoms worsen. Follow-up with your PCP in 24-48 hours as needed.        Recommend retracting the foreskin and alternating with the 2 creams.  Take the full course of antibiotics as prescribed in tandem with a probiotic daily.  Push fluids.  Will contact you with the results of the culture.  If anything changes i.e. increasing discomfort fevers aches or abdominal pain go directly to the emergency room.  Otherwise follow-up with your primary care physician for further evaluation and treatment.

## 2024-02-12 NOTE — ED PROVIDER NOTES
Patient Seen in: [facility]      History     Chief Complaint   Patient presents with    Urinary Symptoms            Stated Complaint: Urinary Symptoms, Abdominal Pain    Subjective:   HPI    28-year-old patient here with complaint of dysuria urgency and frequency x 2 days with some suprapubic cramping.  Patient reports that he had similar symptoms last year and was able to produce the lab that grew out strep pyogenes treated by penicillin.  Patient denies chest pain, shortness of breath, cough, abdominal pain, nausea, vomiting or diarrhea.  She is unaware of hematuria denies flank pain.  Afebrile.    Objective:   Past Medical History:   Diagnosis Date    Acid reflux     Esophageal reflux     Spermatocele               The patient's medication list, past medical history and social history elements  as listed in today's nurse's notes are reviewed and agree.   The patient's family history is reviewed and is noncontributory to the presenting problem, except as indicated as above.   Past Surgical History:   Procedure Laterality Date    OTHER      lipoma removal, L upper shoulder    OTHER      wisdom teeth     OTHER      lipoma removed from neck in 2016                Social History     Socioeconomic History    Marital status:    Tobacco Use    Smoking status: Former    Smokeless tobacco: Never    Tobacco comments:     only for 3 months in 2017   Vaping Use    Vaping Use: Never used   Substance and Sexual Activity    Alcohol use: Not Currently     Alcohol/week: 6.0 standard drinks of alcohol     Types: 6 Standard drinks or equivalent per week     Comment: socially    Drug use: Yes     Frequency: 3.0 times per week     Types: Cannabis   Other Topics Concern    Caffeine Concern Yes     Comment: 2 can of coke every other day.     Exercise Yes     Comment: Takes long walks 3 times weekly.    Social History Narrative    Single    Work- [employer]-     Grad student.               Review of  Systems    Positive for stated complaint: Urinary Symptoms, Abdominal Pain  Other systems are as noted in HPI.  Constitutional and vital signs reviewed.      All other systems reviewed and negative except as noted above.    Physical Exam     ED Triage Vitals [02/12/24 1247]   /87   Pulse 94   Resp 18   Temp 97.5 °F (36.4 °C)   Temp src Temporal   SpO2 100 %   O2 Device None (Room air)       Current:/87   Pulse 94   Temp 97.5 °F (36.4 °C) (Temporal)   Resp 18   Ht 185.4 cm (6' 1\")   Wt 98 kg   SpO2 100%   BMI 28.50 kg/m²         Physical Exam  Vitals and nursing note reviewed. Exam conducted with a chaperone present.   Constitutional:       Appearance: Normal appearance. She is well-developed.   HENT:      Head: Normocephalic.      Right Ear: External ear normal.      Left Ear: External ear normal.      Nose: Nose normal.      Mouth/Throat:      Mouth: Mucous membranes are moist.   Eyes:      Conjunctiva/sclera: Conjunctivae normal.      Pupils: Pupils are equal, round, and reactive to light.   Cardiovascular:      Rate and Rhythm: Normal rate and regular rhythm.      Heart sounds: Normal heart sounds.   Pulmonary:      Effort: Pulmonary effort is normal.      Breath sounds: Normal breath sounds.   Genitourinary:     Penis: Normal and uncircumcised.       Testes: Normal. Cremasteric reflex is present.      Comments: Erythema under foreskin  Musculoskeletal:      Cervical back: Normal range of motion and neck supple.   Skin:     General: Skin is warm.      Capillary Refill: Capillary refill takes less than 2 seconds.   Neurological:      General: No focal deficit present.      Mental Status: She is alert and oriented to person, place, and time.   Psychiatric:         Mood and Affect: Mood normal.         Behavior: Behavior normal.         Thought Content: Thought content normal.         Judgment: Judgment normal.             ED Course     Labs Reviewed   [facility] POCT URINALYSIS DIPSTICK - Abnormal;  Notable for the following components:       Result Value    PH, Urine 8.5 (*)     Leukocyte esterase urine Small (*)     All other components within normal limits   CHLAMYDIA/GONOCOCCUS, URI   URINE CULTURE, ROUTINE                      MDM         Clinical Impression: dysuria/balanitis  Course of Treatment:   Recommend retracting the foreskin and alternating with the 2 creams.  Take the full course of antibiotics as prescribed in tandem with a probiotic daily.  Push fluids.  Will contact you with the results of the culture.  If anything changes i.e. increasing discomfort fevers aches or abdominal pain go directly to the emergency room.  Otherwise follow-up with your primary care physician for further evaluation and treatment.    The patient is encouraged to return if any concerning symptoms arise. Additional verbal discharge instructions are given and discussed. Discharge medications are discussed. The patient is in good condition throughout the visit today and remains so upon discharge. I discuss the plan of care with the patient, who expresses understanding. All questions and concerns are addressed to the patient's satisfaction prior to discharge today.  Previous conversations with PCP and charts were reviewed.                                     Disposition and Plan     Clinical Impression:  1. Dysuria    2. Balanitis         Disposition:  Discharge  2/12/2024  1:42 pm    Follow-up:  Karmen Keller, ADA  [address] [facility] [address]  [phone]                Medications Prescribed:  Current Discharge Medication List        START taking these medications    Details   penicillin v potassium 500 MG Oral Tab Take 1 tablet (500 mg total) by mouth 4 (four) times daily for 7 days.  Qty: 28 tablet, Refills: 0      mupirocin 2 % External Ointment Apply 1 Application topically daily.  Qty: 1 each, Refills: 0      clotrimazole 1 % External Cream Apply 1 Application topically daily.  Qty: 1 each,  Refills: 0

## 2024-02-13 LAB
C TRACH DNA SPEC QL NAA+PROBE: NEGATIVE
N GONORRHOEA DNA SPEC QL NAA+PROBE: NEGATIVE

## 2024-02-23 ENCOUNTER — APPOINTMENT (OUTPATIENT)
Dept: GENERAL RADIOLOGY | Age: 29
End: 2024-02-23
Attending: NURSE PRACTITIONER

## 2024-02-23 ENCOUNTER — HOSPITAL ENCOUNTER (EMERGENCY)
Age: 29
Discharge: HOME | End: 2024-02-23

## 2024-02-23 VITALS
SYSTOLIC BLOOD PRESSURE: 120 MMHG | DIASTOLIC BLOOD PRESSURE: 74 MMHG | BODY MASS INDEX: 28.37 KG/M2 | HEART RATE: 81 BPM | OXYGEN SATURATION: 98 % | TEMPERATURE: 98.7 F | RESPIRATION RATE: 14 BRPM | WEIGHT: 215 LBS

## 2024-02-23 DIAGNOSIS — U07.1 COVID-19 VIRUS INFECTION: ICD-10-CM

## 2024-02-23 DIAGNOSIS — R00.2 PALPITATIONS: Primary | ICD-10-CM

## 2024-02-23 LAB
ALBUMIN SERPL-MCNC: 3.7 G/DL (ref 3.6–5.1)
ALBUMIN/GLOB SERPL: 0.8 {RATIO} (ref 1–2.4)
ALP SERPL-CCNC: 56 UNITS/L (ref 45–117)
ALT SERPL-CCNC: 11 UNITS/L
ANION GAP SERPL CALC-SCNC: 11 MMOL/L (ref 7–19)
AST SERPL-CCNC: 13 UNITS/L
ATRIAL RATE (BPM): 67
BASOPHILS # BLD: 0 K/MCL (ref 0–0.3)
BASOPHILS NFR BLD: 0 %
BILIRUB SERPL-MCNC: 0.4 MG/DL (ref 0.2–1)
BUN SERPL-MCNC: 6 MG/DL (ref 6–20)
BUN/CREAT SERPL: 9 (ref 7–25)
CALCIUM SERPL-MCNC: 9.1 MG/DL (ref 8.4–10.2)
CHLORIDE SERPL-SCNC: 102 MMOL/L (ref 97–110)
CO2 SERPL-SCNC: 29 MMOL/L (ref 21–32)
CREAT SERPL-MCNC: 0.65 MG/DL (ref 0.51–0.95)
D DIMER PPP FEU-MCNC: 0.2 MG/L (FEU)
DEPRECATED RDW RBC: 39.6 FL (ref 39–50)
EGFRCR SERPLBLD CKD-EPI 2021: >90 ML/MIN/{1.73_M2}
EOSINOPHIL # BLD: 0.1 K/MCL (ref 0–0.5)
EOSINOPHIL NFR BLD: 2 %
ERYTHROCYTE [DISTWIDTH] IN BLOOD: 12.6 % (ref 11–15)
FASTING DURATION TIME PATIENT: ABNORMAL H
GLOBULIN SER-MCNC: 4.5 G/DL (ref 2–4)
GLUCOSE SERPL-MCNC: 89 MG/DL (ref 70–99)
HCT VFR BLD CALC: 38.2 % (ref 36–51)
HGB BLD-MCNC: 12.9 G/DL (ref 12–17)
IMM GRANULOCYTES # BLD AUTO: 0 K/MCL (ref 0–0.2)
IMM GRANULOCYTES # BLD: 0 %
LYMPHOCYTES # BLD: 1.4 K/MCL (ref 1–4.8)
LYMPHOCYTES NFR BLD: 24 %
MCH RBC QN AUTO: 29.1 PG (ref 26–34)
MCHC RBC AUTO-ENTMCNC: 33.8 G/DL (ref 32–36.5)
MCV RBC AUTO: 86 FL (ref 78–100)
MONOCYTES # BLD: 0.6 K/MCL (ref 0.3–0.9)
MONOCYTES NFR BLD: 9 %
NEUTROPHILS # BLD: 3.8 K/MCL (ref 1.8–7.7)
NEUTROPHILS NFR BLD: 65 %
NRBC BLD MANUAL-RTO: 0 /100 WBC
P AXIS (DEGREES): 27
PLATELET # BLD AUTO: 281 K/MCL (ref 140–450)
POTASSIUM SERPL-SCNC: 4.2 MMOL/L (ref 3.4–5.1)
PR-INTERVAL (MSEC): 148
PROT SERPL-MCNC: 8.2 G/DL (ref 6.4–8.2)
QRS-INTERVAL (MSEC): 104
QT-INTERVAL (MSEC): 384
QTC: 405
R AXIS (DEGREES): 80
RBC # BLD: 4.44 MIL/MCL (ref 4–5.2)
REPORT TEXT: NORMAL
SODIUM SERPL-SCNC: 138 MMOL/L (ref 135–145)
T AXIS (DEGREES): 61
TROPONIN I SERPL DL<=0.01 NG/ML-MCNC: <4 NG/L
VENTRICULAR RATE EKG/MIN (BPM): 67
WBC # BLD: 6 K/MCL (ref 4.2–11)

## 2024-02-23 PROCEDURE — 85379 FIBRIN DEGRADATION QUANT: CPT | Performed by: NURSE PRACTITIONER

## 2024-02-23 PROCEDURE — 93005 ELECTROCARDIOGRAM TRACING: CPT | Performed by: NURSE PRACTITIONER

## 2024-02-23 PROCEDURE — 36415 COLL VENOUS BLD VENIPUNCTURE: CPT

## 2024-02-23 PROCEDURE — 85025 COMPLETE CBC W/AUTO DIFF WBC: CPT | Performed by: NURSE PRACTITIONER

## 2024-02-23 PROCEDURE — 80053 COMPREHEN METABOLIC PANEL: CPT | Performed by: NURSE PRACTITIONER

## 2024-02-23 PROCEDURE — 99285 EMERGENCY DEPT VISIT HI MDM: CPT

## 2024-02-23 PROCEDURE — 84484 ASSAY OF TROPONIN QUANT: CPT | Performed by: NURSE PRACTITIONER

## 2024-02-23 PROCEDURE — 71046 X-RAY EXAM CHEST 2 VIEWS: CPT

## 2024-02-23 PROCEDURE — 93010 ELECTROCARDIOGRAM REPORT: CPT | Performed by: INTERNAL MEDICINE

## 2024-02-23 ASSESSMENT — PAIN SCALES - GENERAL: PAINLEVEL_OUTOF10: 3

## 2024-02-24 ENCOUNTER — PATIENT MESSAGE (OUTPATIENT)
Dept: GASTROENTEROLOGY | Facility: CLINIC | Age: 29
End: 2024-02-24

## 2024-02-24 DIAGNOSIS — R10.13 EPIGASTRIC PAIN: ICD-10-CM

## 2024-02-24 DIAGNOSIS — S06.9XAA TRAUMATIC BRAIN INJURY, WITH UNKNOWN LOSS OF CONSCIOUSNESS STATUS, INITIAL ENCOUNTER (HCC): Primary | ICD-10-CM

## 2024-02-26 NOTE — TELEPHONE ENCOUNTER
Dr. Patricia    Just FYI  I have been messaging with patient and she is doing better.  Nothing urgent.

## 2024-02-26 NOTE — TELEPHONE ENCOUNTER
From: Re Jackson  To: Belem Encios  Sent: 2/24/2024 12:41 PM CST  Subject: Stomach Pain Sleep Issues after Covid    Hi Dr. Enciso,    I've had covid this past week, and while I'm feeling better from the infection, my stomach symptoms have worsened. I've had slower bowel movement, and I've eaten less solid food, but I'm having persistent stomach pain that is making it difficult to sleep. It has also been difficult to eat at least the minimum 1500 calories daily. Can you advise me?    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 I understand motility issues can result from TBI-related nerve damage, and covid can also damage nerves. Would you recommend that I see a neurologist to explore this connection?    Thank you,  Re Jackson

## 2024-02-28 ENCOUNTER — OFFICE VISIT (OUTPATIENT)
Dept: FAMILY MEDICINE CLINIC | Facility: CLINIC | Age: 29
End: 2024-02-28
Payer: MEDICAID

## 2024-02-28 ENCOUNTER — HOSPITAL ENCOUNTER (OUTPATIENT)
Age: 29
Discharge: HOME | End: 2024-02-28
Payer: MEDICAID

## 2024-02-28 VITALS
HEART RATE: 91 BPM | HEIGHT: 72 IN | OXYGEN SATURATION: 100 % | DIASTOLIC BLOOD PRESSURE: 76 MMHG | TEMPERATURE: 98 F | BODY MASS INDEX: 28.17 KG/M2 | SYSTOLIC BLOOD PRESSURE: 123 MMHG | WEIGHT: 208 LBS | RESPIRATION RATE: 18 BRPM

## 2024-02-28 VITALS
BODY MASS INDEX: 29.26 KG/M2 | SYSTOLIC BLOOD PRESSURE: 132 MMHG | HEIGHT: 72 IN | HEART RATE: 105 BPM | WEIGHT: 216 LBS | RESPIRATION RATE: 18 BRPM | TEMPERATURE: 97 F | OXYGEN SATURATION: 100 % | DIASTOLIC BLOOD PRESSURE: 58 MMHG

## 2024-02-28 DIAGNOSIS — R30.0 DYSURIA: Primary | ICD-10-CM

## 2024-02-28 DIAGNOSIS — Z00.00 ENCOUNTER FOR ANNUAL PHYSICAL EXAM: Primary | ICD-10-CM

## 2024-02-28 PROBLEM — R00.2 PALPITATIONS: Status: ACTIVE | Noted: 2022-10-05

## 2024-02-28 PROBLEM — F64.0 GENDER DYSPHORIA IN ADULT: Status: ACTIVE | Noted: 2020-12-09

## 2024-02-28 PROBLEM — R94.31 ABNORMAL ECG: Status: ACTIVE | Noted: 2022-10-05

## 2024-02-28 LAB
BILIRUB UR QL STRIP: NEGATIVE
CLARITY UR: CLEAR
COLOR UR: YELLOW
GLUCOSE UR STRIP-MCNC: NEGATIVE MG/DL
HGB UR QL STRIP: NEGATIVE
KETONES UR STRIP-MCNC: NEGATIVE MG/DL
LEUKOCYTE ESTERASE UR QL STRIP: NEGATIVE
NITRITE UR QL STRIP: NEGATIVE
PH UR STRIP: 7.5 [PH]
PROT UR STRIP-MCNC: NEGATIVE MG/DL
SP GR UR STRIP: 1.01
UROBILINOGEN UR STRIP-ACNC: <2 MG/DL

## 2024-02-28 PROCEDURE — 81002 URINALYSIS NONAUTO W/O SCOPE: CPT

## 2024-02-28 PROCEDURE — 99214 OFFICE O/P EST MOD 30 MIN: CPT

## 2024-02-28 PROCEDURE — 87086 URINE CULTURE/COLONY COUNT: CPT | Performed by: NURSE PRACTITIONER

## 2024-02-28 PROCEDURE — 99213 OFFICE O/P EST LOW 20 MIN: CPT

## 2024-02-28 PROCEDURE — 99385 PREV VISIT NEW AGE 18-39: CPT | Performed by: FAMILY MEDICINE

## 2024-02-28 RX ORDER — PROPRANOLOL HYDROCHLORIDE 10 MG/1
10 TABLET ORAL
COMMUNITY

## 2024-02-28 NOTE — PROGRESS NOTES
/58   Pulse 105   Temp 97.4 °F (36.3 °C) (Temporal)   Resp 18   Ht 6' 1\" (1.854 m)   Wt 216 lb (98 kg)   SpO2 100%   BMI 28.50 kg/m²  Body mass index is 28.5 kg/m².     Chief Complaint   Patient presents with    Establish Care    Sleep Problem    Rapid Heart Beat       Re Jackson is a 28 year old adult who presents for a complete physical exam.   HPI:   Patient is a 28-year-old transgender female on multiple medications  Patient is here today to establish care  Previous PCP is in [place],    Recently diagnosed with COVID, today is day #10, she had palpitations, anxiety, loss of smell and taste  Seems to be getting better now    Diagnosed with gastroparesis and chronic constipation currently on linaclotide and MiraLAX  Patient is scheduled for gastric motility study    She is currently off of progesterone, Omeprazole as well as Truvada      Wt Readings from Last 4 Encounters:   02/28/24 216 lb (98 kg)   02/12/24 216 lb (98 kg)   01/16/24 225 lb (102.1 kg)   01/16/24 228 lb (103.4 kg)     Body mass index is 28.5 kg/m².   BP Readings from Last 3 Encounters:   02/28/24 132/58   02/12/24 138/87   01/19/24 124/75      Current Outpatient Medications   Medication Sig Dispense Refill    linaCLOtide (LINZESS OR) Take by mouth.      propranolol 10 MG Oral Tab Take 1 tablet (10 mg total) by mouth daily as needed.      mupirocin 2 % External Ointment Apply 1 Application topically daily. 1 each 0    clotrimazole 1 % External Cream Apply 1 Application topically daily. 1 each 0    Probiotic Product (PROBIOTIC DAILY OR) Take by mouth.      Acetaminophen (TYLENOL OR)       IBUPROFEN OR       Cetirizine HCl (ZYRTEC OR) Take by mouth as needed.      spironolactone 100 MG Oral Tab Take 1 tablet (100 mg total) by mouth daily.      estradiol 2 MG Oral Tab Take 2 tablets (4 mg total) by mouth daily.      Melatonin 10 MG Oral Cap Take 1 tablet by mouth nightly as needed. 6mg      Omeprazole 40 MG Oral Capsule Delayed  Release Take 1 capsule (40 mg total) by mouth 2 (two) times daily before meals. (Patient not taking: Reported on 2/28/2024) 180 capsule 0    Emtricitabine-Tenofovir DF (TRUVADA OR) Take by mouth. (Patient not taking: Reported on 12/17/2023)      Progesterone Micronized 100 MG Oral Cap Take 1 capsule (100 mg total) by mouth daily. (Patient not taking: Reported on 2/28/2024)        Past Medical History:   Diagnosis Date    Acid reflux     Esophageal reflux     Spermatocele       Past Surgical History:   Procedure Laterality Date    OTHER      lipoma removal, L upper shoulder    OTHER      wisdom teeth     OTHER      lipoma removed from neck in 2016      Family History   Problem Relation Age of Onset    Asthma Mother     Anxiety Mother     Depression Father         SSRI    Hypertension Father     Other (sleep apnea) Father     Cancer Maternal Grandmother     Alcohol and Other Disorders Associated Maternal Grandmother     Cancer Maternal Grandfather     Alcohol and Other Disorders Associated Maternal Grandfather     Diabetes Paternal Grandfather     Heart Disorder Paternal Grandfather     OCD Sister     Depression Brother       Social History:  Social History     Socioeconomic History    Marital status:    Tobacco Use    Smoking status: Former    Smokeless tobacco: Never    Tobacco comments:     only for 3 months in 2017   Vaping Use    Vaping Use: Never used   Substance and Sexual Activity    Alcohol use: Not Currently     Alcohol/week: 6.0 standard drinks of alcohol     Types: 6 Standard drinks or equivalent per week     Comment: socially    Drug use: Yes     Frequency: 3.0 times per week     Types: Cannabis   Other Topics Concern    Caffeine Concern Yes     Comment: 2 can of coke every other day.     Exercise Yes     Comment: Takes long walks 3 times weekly.       Exercise: none.  Diet: doesn't watch     REVIEW OF SYSTEMS:   GENERAL HEALTH: feels well otherwise, denies fever  SKIN: denies any unusual skin  lesions or rashes  EYES: no visual complaints or deficits  HEENT: denies nasal congestion, sinus pain or sore throat; hearing loss negative  RESPIRATORY: denies shortness of breath, wheezing or cough  CARDIOVASCULAR: denies chest pain or LR; no palpitations  GI: denies nausea, vomiting, constipation, diarrhea; no rectal bleeding; no heartburn  MUSCULOSKELETAL: no joint complaints upper or lower extremities  NEURO: no sensory or motor complaint  PSYCHE: no symptoms of depression or anxiety  HEMATOLOGY: denies h/o anemia; denies bruising or excessive bleeding  ENDOCRINE: denies excessive thirst or urination; denies unexpected wt gain or wt loss  ALLERGY/IMM.: denies food or seasonal allergies    EXAM:   /58   Pulse 105   Temp 97.4 °F (36.3 °C) (Temporal)   Resp 18   Ht 6' 1\" (1.854 m)   Wt 216 lb (98 kg)   SpO2 100%   BMI 28.50 kg/m²  Body mass index is 28.5 kg/m².   GENERAL: well developed, well nourished,in no apparent distress  SKIN: no rashes,no suspicious lesions  HEENT: atraumatic, normocephalic,ears and throat are clear  EYES:PERRLA, EOMI,conjunctiva are clear  NECK: supple,no adenopathy,no bruits  CHEST: no chest tenderness  LUNGS: clear to auscultation  CARDIO: RRR without murmur  GI: good BS's,no masses, HSM or tenderness  : Deferred  RECTAL:Deferred  MUSCULOSKELETAL: back is not tender,FROM of the back  EXTREMITIES: no cyanosis, clubbing or edema  NEURO: Oriented times three,cranial nerves are intact,motor and sensory are grossly intact    ASSESSMENT AND PLAN:   :Re was seen today for establish care, sleep problem and rapid heart beat.    Diagnoses and all orders for this visit:    Encounter for annual physical exam  Re Jackson is a 28 year old adult who presents for a complete physical exam.   Pt's weight is Body mass index is 28.5 kg/m².,   Health maintenance,  Eat a heart-healthy diet. Include potassium and fiber, and drink plenty of water.   Exercise regularly --- Dietary  measures discussed include diet about 1800 calories - Excercise regimen also reviewed as well as long term benefits on overall health.   Recommend at least 30 minutes a day 3-4 times a week of aerobic activity such as brisk walking, cycling, aerobics, or swimming.  Anerobic activities also encouraged for overall toning and strength/endurance building-       Lipid Panel; Future  -     TSH and Free T4; Future  -     CBC With Differential With Platelet; Future  -     Comp Metabolic Panel (14); Future        The patient indicates understanding of these issues and agrees to the plan.  .      No follow-ups on file.        Ryley Chandra MD, 2/28/2024, 11:33 AM      This dictation was performed with a verbal recognition program (DRAGON) and it was checked for errors. It is possible that there are still dictated errors within this office note. If so, please bring any errors to my attention for an addendum. All efforts were made to ensure that this office note is accurate

## 2024-02-28 NOTE — DISCHARGE INSTRUCTIONS
We will call with your urine culture result.  If positive I recommend urology follow up.  Use over the counter Azo for your symptoms.

## 2024-02-28 NOTE — ED PROVIDER NOTES
Patient Seen in: [facility]      History     Chief Complaint   Patient presents with    Urinary Symptoms     Stated Complaint: urinary tract pain    Subjective:   HPI  28-year-old male to female with GERD, spermatocele, and concussion presents complaining of dysuria that started last night.  Patient is not circumcised.  She denies any testicular pain or swelling.  She reports some suprapubic tenderness.  She denies any flank pain.    Objective:   Past Medical History:   Diagnosis Date    Acid reflux     Esophageal reflux     Spermatocele               Past Surgical History:   Procedure Laterality Date    OTHER      lipoma removal, L upper shoulder    OTHER      wisdom teeth     OTHER      lipoma removed from neck in 2016                No pertinent social history.            Review of Systems   All other systems reviewed and are negative.      Positive for stated complaint: urinary tract pain  Other systems are as noted in HPI.  Constitutional and vital signs reviewed.      All other systems reviewed and negative except as noted above.    Physical Exam     ED Triage Vitals [02/28/24 1519]   /76   Pulse 91   Resp 18   Temp 97.5 °F (36.4 °C)   Temp src Temporal   SpO2 100 %   O2 Device None (Room air)       Current:/76   Pulse 91   Temp 97.5 °F (36.4 °C) (Temporal)   Resp 18   Ht 185.4 cm (6' 1\")   Wt 94.3 kg   SpO2 100%   BMI 27.44 kg/m²         Physical Exam  Vitals and nursing note reviewed.   Constitutional:       Appearance: She is well-developed.   Cardiovascular:      Rate and Rhythm: Normal rate and regular rhythm.      Heart sounds: Normal heart sounds.   Pulmonary:      Effort: Pulmonary effort is normal.      Breath sounds: Normal breath sounds.   Abdominal:      Tenderness: There is abdominal tenderness (Suprapubic). There is no right CVA tenderness or left CVA tenderness.   Skin:     General: Skin is warm and dry.   Neurological:      Mental Status: She is alert and  oriented to person, place, and time.            ED Course     Labs Reviewed   [facility] POCT URINALYSIS DIPSTICK   URINE CULTURE, ROUTINE                      MDM     Medical Decision Making  28-year-old male to female with GERD, spermatocele, and concussion presents complaining of dysuria that started last night.  Patient is not circumcised.  She denies any testicular pain or swelling.  She reports some suprapubic tenderness.  She denies any flank pain.    Clinical impression: Dysuria    Differential diagnosis: Dysuria, UTI, prostatitis    Patient was just treated with penicillin for UTI a couple weeks ago with a urine culture.  She is well-appearing and afebrile.  Urine dip is negative.  Culture will be sent and treat accordingly.  Patient was given follow-up with urology as needed.    Problems Addressed:  Dysuria: acute illness or injury        Disposition and Plan     Clinical Impression:  1. Dysuria         Disposition:  Discharge  2/28/2024  3:50 pm    Follow-up:  Fani Pablo PA-C  [address] [place] [id number]  [phone]    Call             Medications Prescribed:  Discharge Medication List as of 2/28/2024  4:01 PM

## 2024-02-28 NOTE — ED INITIAL ASSESSMENT (HPI)
Pt here for pain on urination since last night.   Denies frequency urinating.    Denies fever.   Lower abd pain.   Last uti was in Jan.

## 2024-03-04 ENCOUNTER — TELEPHONE (OUTPATIENT)
Facility: CLINIC | Age: 29
End: 2024-03-04

## 2024-03-04 NOTE — TELEPHONE ENCOUNTER
Was unable to get a hold of patient to move up GES   There was an opening for tomorrow morning but patient not answering.

## 2024-03-06 NOTE — TELEPHONE ENCOUNTER
Central Scheduling calling back.  She states the insurance that pt has on file is inactive.  Pt will also need to update insurance for procedure.

## 2024-03-07 NOTE — TELEPHONE ENCOUNTER
Spoke to central scheduling -    The name on patient's insurance card is Car Jackson  In order to get approvals, we will need to use this name.

## 2024-03-15 ENCOUNTER — APPOINTMENT (OUTPATIENT)
Dept: GENERAL RADIOLOGY | Facility: HOSPITAL | Age: 29
End: 2024-03-15
Attending: EMERGENCY MEDICINE

## 2024-03-15 ENCOUNTER — HOSPITAL ENCOUNTER (EMERGENCY)
Facility: HOSPITAL | Age: 29
Discharge: HOME | End: 2024-03-15
Attending: EMERGENCY MEDICINE

## 2024-03-15 VITALS
BODY MASS INDEX: 27.36 KG/M2 | SYSTOLIC BLOOD PRESSURE: 119 MMHG | HEIGHT: 72 IN | WEIGHT: 202 LBS | TEMPERATURE: 97 F | DIASTOLIC BLOOD PRESSURE: 79 MMHG | HEART RATE: 75 BPM | RESPIRATION RATE: 18 BRPM | OXYGEN SATURATION: 100 %

## 2024-03-15 DIAGNOSIS — R07.89 CHEST PAIN, MUSCULOSKELETAL: Primary | ICD-10-CM

## 2024-03-15 LAB
ALBUMIN SERPL-MCNC: 4.2 G/DL (ref 3.4–5)
ALBUMIN/GLOB SERPL: 1.1 {RATIO} (ref 1–2)
ALP LIVER SERPL-CCNC: 52 U/L
ALT SERPL-CCNC: 25 U/L
ANION GAP SERPL CALC-SCNC: 4 MMOL/L (ref 0–18)
AST SERPL-CCNC: 13 U/L (ref 15–37)
BASOPHILS # BLD AUTO: 0.03 X10(3) UL (ref 0–0.2)
BASOPHILS NFR BLD AUTO: 0.3 %
BILIRUB SERPL-MCNC: 0.7 MG/DL (ref 0.1–2)
BUN BLD-MCNC: 10 MG/DL (ref 9–23)
CALCIUM BLD-MCNC: 9.7 MG/DL (ref 8.5–10.1)
CHLORIDE SERPL-SCNC: 105 MMOL/L (ref 98–112)
CO2 SERPL-SCNC: 27 MMOL/L (ref 21–32)
CREAT BLD-MCNC: 0.7 MG/DL
D DIMER PPP FEU-MCNC: <0.27 UG/ML FEU (ref ?–0.5)
EGFRCR SERPLBLD CKD-EPI 2021: 120 ML/MIN/1.73M2 (ref 60–?)
EOSINOPHIL # BLD AUTO: 0.1 X10(3) UL (ref 0–0.7)
EOSINOPHIL NFR BLD AUTO: 1 %
ERYTHROCYTE [DISTWIDTH] IN BLOOD BY AUTOMATED COUNT: 12.5 %
GLOBULIN PLAS-MCNC: 3.7 G/DL (ref 2.8–4.4)
GLUCOSE BLD-MCNC: 96 MG/DL (ref 70–99)
HCT VFR BLD AUTO: 37.6 %
HGB BLD-MCNC: 13.5 G/DL
IMM GRANULOCYTES # BLD AUTO: 0.03 X10(3) UL (ref 0–1)
IMM GRANULOCYTES NFR BLD: 0.3 %
LYMPHOCYTES # BLD AUTO: 2.13 X10(3) UL (ref 1–4)
LYMPHOCYTES NFR BLD AUTO: 21.2 %
MCH RBC QN AUTO: 29.4 PG (ref 26–34)
MCHC RBC AUTO-ENTMCNC: 35.9 G/DL (ref 31–37)
MCV RBC AUTO: 81.9 FL
MONOCYTES # BLD AUTO: 0.76 X10(3) UL (ref 0.1–1)
MONOCYTES NFR BLD AUTO: 7.5 %
NEUTROPHILS # BLD AUTO: 7.02 X10 (3) UL (ref 1.5–7.7)
NEUTROPHILS # BLD AUTO: 7.02 X10(3) UL (ref 1.5–7.7)
NEUTROPHILS NFR BLD AUTO: 69.7 %
OSMOLALITY SERPL CALC.SUM OF ELEC: 281 MOSM/KG (ref 275–295)
PLATELET # BLD AUTO: 354 10(3)UL (ref 150–450)
POTASSIUM SERPL-SCNC: 3.7 MMOL/L (ref 3.5–5.1)
PROT SERPL-MCNC: 7.9 G/DL (ref 6.4–8.2)
RBC # BLD AUTO: 4.59 X10(6)UL
SODIUM SERPL-SCNC: 136 MMOL/L (ref 136–145)
TROPONIN I SERPL HS-MCNC: <3 NG/L
TROPONIN I SERPL HS-MCNC: <3 NG/L
WBC # BLD AUTO: 10.1 X10(3) UL (ref 4–11)

## 2024-03-15 PROCEDURE — 80053 COMPREHEN METABOLIC PANEL: CPT

## 2024-03-15 PROCEDURE — 85025 COMPLETE CBC W/AUTO DIFF WBC: CPT

## 2024-03-15 PROCEDURE — 80053 COMPREHEN METABOLIC PANEL: CPT | Performed by: EMERGENCY MEDICINE

## 2024-03-15 PROCEDURE — 84484 ASSAY OF TROPONIN QUANT: CPT | Performed by: EMERGENCY MEDICINE

## 2024-03-15 PROCEDURE — 99284 EMERGENCY DEPT VISIT MOD MDM: CPT

## 2024-03-15 PROCEDURE — 85025 COMPLETE CBC W/AUTO DIFF WBC: CPT | Performed by: EMERGENCY MEDICINE

## 2024-03-15 PROCEDURE — 93005 ELECTROCARDIOGRAM TRACING: CPT

## 2024-03-15 PROCEDURE — 72050 X-RAY EXAM NECK SPINE 4/5VWS: CPT | Performed by: EMERGENCY MEDICINE

## 2024-03-15 PROCEDURE — 93010 ELECTROCARDIOGRAM REPORT: CPT

## 2024-03-15 PROCEDURE — 71046 X-RAY EXAM CHEST 2 VIEWS: CPT | Performed by: EMERGENCY MEDICINE

## 2024-03-15 PROCEDURE — 84484 ASSAY OF TROPONIN QUANT: CPT

## 2024-03-15 PROCEDURE — 36415 COLL VENOUS BLD VENIPUNCTURE: CPT

## 2024-03-15 PROCEDURE — 85379 FIBRIN DEGRADATION QUANT: CPT | Performed by: EMERGENCY MEDICINE

## 2024-03-15 PROCEDURE — 99285 EMERGENCY DEPT VISIT HI MDM: CPT

## 2024-03-15 NOTE — ED INITIAL ASSESSMENT (HPI)
Pt to ED c/o dizziness and low heart rate, states heart rate is normally high and when they checked today heart rate was 52, c/o left sided chest pain and shoulder that started today

## 2024-03-16 LAB
ATRIAL RATE: 71 BPM
P AXIS: 10 DEGREES
P-R INTERVAL: 136 MS
Q-T INTERVAL: 388 MS
QRS DURATION: 96 MS
QTC CALCULATION (BEZET): 421 MS
R AXIS: 62 DEGREES
T AXIS: 41 DEGREES
VENTRICULAR RATE: 71 BPM

## 2024-03-16 NOTE — ED PROVIDER NOTES
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  No fever cough or cold symptoms no history of pulmonary embolus or DVT.  Patient denies any cardiac risk factors.  Patient does have a history of esophageal reflux and possible gastroparesis.      Objective:   Past Medical History:   Diagnosis Date    Acid reflux     Esophageal reflux     Spermatocele               Past Surgical History:   Procedure Laterality Date    OTHER      lipoma removal, L upper shoulder    OTHER      wisdom teeth     OTHER      lipoma removed from neck in 2016                Social History     Socioeconomic History    Marital status:    Tobacco Use    Smoking status: Former    Smokeless tobacco: Never    Tobacco comments:     only for 3 months in 2017   Vaping Use    Vaping Use: Never used   Substance and Sexual Activity    Alcohol use: Not Currently     Alcohol/week: 6.0 standard drinks of alcohol     Types: 6 Standard drinks or equivalent per week     Comment: socially    Drug use: Yes     Frequency: 3.0 times per week     Types: Cannabis   Other Topics Concern    Caffeine Concern Yes     Comment: 2 can of coke every other day.     Exercise Yes     Comment: Takes long walks 3 times weekly.    Social History Narrative    Single    Work- [employer]-     Grad student.               Review of Systems    Positive for stated complaint: dizzy/arrythmia  Other systems are as noted in HPI.  Constitutional and vital signs reviewed.      All other systems  reviewed and negative except as noted above.    Physical Exam     ED Triage Vitals [03/15/24 1852]   /80   Pulse 82   Resp 18   Temp 97 °F (36.1 °C)   Temp src Temporal   SpO2 100 %   O2 Device None (Room air)       Current:/79   Pulse 75   Temp 97 °F (36.1 °C) (Temporal)   Resp 18   Ht 185.4 cm (6' 1\")   Wt 91.6 kg   SpO2 100%   BMI 26.65 kg/m²         Physical Exam    Patient is alert orient x 3 no acute distress HEENT exam within normal limits neck there is no lymphadenopathy or JVD lungs are clear cardiovascular exam shows regular rate and rhythm without murmurs abdomen soft nontender extremities no clubbing sinus edema the left upper chest wall is mildly tender  Mental status alert and oriented ×3  Cranial nerves II through XII within normal limits  Motor function is intact in all 4 extremities  Sensation is intact in all 4 extremities  Cerebellar finger-nose and heel-to-shin are normal  Deep tendon reflexes are normal    ED Course     Labs Reviewed   COMP METABOLIC PANEL (14) - Abnormal; Notable for the following components:       Result Value    AST 13 (*)     All other components within normal limits   TROPONIN I HIGH SENSITIVITY - Normal   D-DIMER - Normal   TROPONIN I HIGH SENSITIVITY - Normal   CBC WITH DIFFERENTIAL WITH PLATELET    Narrative:     The following orders were created for panel order CBC With Differential With Platelet.  Procedure                               Abnormality         Status                     ---------                               -----------         ------                     CBC W/ DIFFERENTIAL[[id number]]                              Final result                 Please view results for these tests on the individual orders.   RAINBOW DRAW LAVENDER   RAINBOW DRAW LIGHT GREEN   CBC W/ DIFFERENTIAL     EKG    Rate, intervals and axes as noted on EKG Report.  Rate: 71  Rhythm: Sinus Rhythm  Reading: Normal EKG                 XR CERVICAL SPINE (4VIEWS)  (CPT=72050)    Result Date: 3/15/2024  CONCLUSION:  No acute cervical spine fracture or subluxation.   LOCATION:  [facility]   Dictated by (CST): Porsha Weber MD on 3/15/2024 at 10:40 PM     Finalized by (CST): Porsha Weber MD on 3/15/2024 at 10:40 PM       XR CHEST PA + LAT CHEST (CPT=71046)    Result Date: 3/15/2024  CONCLUSION:  No acute cardiopulmonary process.   LOCATION:  [facility]   Dictated by (CST): Porsha Weber MD on 3/15/2024 at 10:38 PM     Finalized by (CST): Porsha Weber MD on 3/15/2024 at 10:39 PM      Independent reviewed there is no fracture there is no pneumonia or pneumothorax.     Labs unremarkable.    MDM    I initial differential diagnosis considered but not limited to includes ACS pulmonary embolus  Musculoskeletal chest pain pleurisy cervical radiculopathy    Patient's symptoms had resolved while in the emergency department the patient appeared clinically well no evidence of pulmonary embolus dissection and ACS symptoms seem to be more consistent with musculoskeletal pain advised anti-inflammatory medicine follow-up doctor in few days return if worse.                               Medical Decision Making      Disposition and Plan     Clinical Impression:  1. Chest pain, musculoskeletal         Disposition:  Discharge  3/15/2024 11:02 pm    Follow-up:  Karmen Keller APRN  [address] [facility] [address]  [phone]    Follow up            Medications Prescribed:  Current Discharge Medication List

## 2024-04-02 ENCOUNTER — TELEPHONE (OUTPATIENT)
Facility: CLINIC | Age: 29
End: 2024-04-02

## 2024-04-02 NOTE — TELEPHONE ENCOUNTER
First reminder letter sent out via My Chart for the following:    [facility] GASTRIC EMPTYING STUDY  (CPT=78264) ([id number]) [[id number]] (Order [id number])

## 2024-04-13 ENCOUNTER — HOSPITAL ENCOUNTER (OUTPATIENT)
Age: 29
Discharge: HOME | End: 2024-04-13
Attending: EMERGENCY MEDICINE
Payer: COMMERCIAL

## 2024-04-13 VITALS
HEART RATE: 84 BPM | RESPIRATION RATE: 18 BRPM | SYSTOLIC BLOOD PRESSURE: 134 MMHG | WEIGHT: 200 LBS | DIASTOLIC BLOOD PRESSURE: 86 MMHG | TEMPERATURE: 98 F | BODY MASS INDEX: 27.09 KG/M2 | HEIGHT: 72 IN | OXYGEN SATURATION: 98 %

## 2024-04-13 DIAGNOSIS — R10.9 ABDOMINAL PAIN, ACUTE: Primary | ICD-10-CM

## 2024-04-13 DIAGNOSIS — K29.70 GASTRITIS, PRESENCE OF BLEEDING UNSPECIFIED, UNSPECIFIED CHRONICITY, UNSPECIFIED GASTRITIS TYPE: ICD-10-CM

## 2024-04-13 LAB
BILIRUB UR QL STRIP: NEGATIVE
COLOR UR: YELLOW
GLUCOSE UR STRIP-MCNC: NEGATIVE MG/DL
KETONES UR STRIP-MCNC: NEGATIVE MG/DL
LEUKOCYTE ESTERASE UR QL STRIP: NEGATIVE
NITRITE UR QL STRIP: NEGATIVE
PH UR STRIP: 6 [PH]
PROT UR STRIP-MCNC: NEGATIVE MG/DL
SP GR UR STRIP: 1.01
UROBILINOGEN UR STRIP-ACNC: <2 MG/DL

## 2024-04-13 PROCEDURE — 81002 URINALYSIS NONAUTO W/O SCOPE: CPT

## 2024-04-13 PROCEDURE — 99213 OFFICE O/P EST LOW 20 MIN: CPT

## 2024-04-13 PROCEDURE — 99212 OFFICE O/P EST SF 10 MIN: CPT

## 2024-04-13 NOTE — ED INITIAL ASSESSMENT (HPI)
1 week c/o sharp/sore RUQ abdominal pain with a large amount of dark blood in hard stool and nausea. Denies vomiting or fevers. On Miralax, Prilosec and Pepcid. Denies solid food in 3 days.

## 2024-04-13 NOTE — DISCHARGE INSTRUCTIONS
Take 1000 mg acetaminophen (2 Tylenol tablets) every 6 hours as needed for pain or fever.    Use Maalox or Mylanta, 30 cc, every 4 hours as needed for discomfort.  Resume taking the omeprazole.

## 2024-04-13 NOTE — ED PROVIDER NOTES
Patient Seen in: [facility]      History     Chief Complaint   Patient presents with    Abdomen/Flank Pain     Stated Complaint: Stomach pain, Blood in stool    Subjective:     HPI    28-year-old female who went through gender transition and has a history of GERD who comes in with abdominal discomfort.  She said it started more on the right side and now it is more in the anterior area.  No vomiting.  She saw some flecks of black material in her stools.  No hematochezia.  She happens to have a an appointment with her gastroenterologist this coming week for upper endoscopy.  No vomiting.  She has chronic constipation and uses MiraLAX/Linzess as needed.  She tried Prilosec and Pepcid.    Objective:   Past Medical History:    Acid reflux    Esophageal reflux    Spermatocele              Past Surgical History:   Procedure Laterality Date    Other      lipoma removal, L upper shoulder    Other      wisdom teeth     Other      lipoma removed from neck in 2016                Social History     Socioeconomic History    Marital status:    Tobacco Use    Smoking status: Former    Smokeless tobacco: Never    Tobacco comments:     only for 3 months in 2017   Vaping Use    Vaping status: Never Used   Substance and Sexual Activity    Alcohol use: Not Currently     Alcohol/week: 6.0 standard drinks of alcohol     Types: 6 Standard drinks or equivalent per week     Comment: socially    Drug use: Yes     Frequency: 3.0 times per week     Types: Cannabis   Other Topics Concern    Caffeine Concern Yes     Comment: 2 can of coke every other day.     Exercise Yes     Comment: Takes long walks 3 times weekly.    Social History Narrative    Single    Work- [employer]-     Grad student.      Social Determinants of Health     Financial Resource Strain: Not on File (10/8/2022)    [facility] from [facility]     Financial Resource Strain     Financial Resource Strain: 0   Food Insecurity: Not on File  (10/8/2022)    [facility] from [facility]     Food Insecurity     Food: 0   Transportation Needs: Not on File (10/8/2022)    [facility] from [facility]     Transportation Needs     Transportation: 0   Physical Activity: Not on File (10/8/2022)    [facility] from [facility]     Physical Activity     Physical Activity: 0   Stress: Not on File (10/8/2022)    [facility] from [facility]     Stress     Stress: 0   Social Connections: Not on File (10/8/2022)    [facility] from [facility]     Social Connections     Social Connections and Isolation: 0   Housing Stability: Not on File (10/8/2022)    Received from [facility]     Housing Stability     Housin              Review of Systems    Positive for stated complaint: Stomach pain, Blood in stool  Other systems are as noted in HPI.  Constitutional and vital signs reviewed.      All other systems reviewed and negative except as noted above.    Physical Exam     ED Triage Vitals [24 1327]   /86   Pulse 84   Resp 18   Temp 97.8 °F (36.6 °C)   Temp src Temporal   SpO2 98 %   O2 Device None (Room air)       Current:/86   Pulse 84   Temp 97.8 °F (36.6 °C) (Temporal)   Resp 18   Ht 185.4 cm (6' 1\")   Wt 90.7 kg   SpO2 98%   BMI 26.39 kg/m²       General:  Vitals as listed.  No acute distress   HEENT: Sclerae anicteric.  Conjunctivae show no pallor.  Oropharynx clear, mucous membranes moist   Lungs: good air exchange  Abdomen: Moderate diffuse tenderness.  No umbilical hernia.  No abdominal masses.  No peritoneal signs   Extremities: no edema, normal peripheral pulses   Neuro: Alert oriented and nonfocal    ED Course     Labs Reviewed   [facility] POCT URINALYSIS DIPSTICK - Abnormal; Notable for the following components:       Result Value    Urine Clarity Slightly cloudy (*)     Blood, Urine Trace-Intact (*)     All other components within normal limits     ED COURSE and MDM     I reviewed prior external notes including ultrasound of the abdomen done  12/17/2023 showed a 5 mm gallbladder polyp    To follow-up with primary care and gastroenterology this coming week.    Can use Tylenol for discomfort.  Can use Maalox/Mylanta and omeprazole.    I have discussed with the patient the results of testing, differential diagnosis, and treatment plan. They expressed clear understanding of these instructions and agrees to the plan provided.    Disposition and Plan     Clinical Impression:  1. Abdominal pain, acute    2. Gastritis, presence of bleeding unspecified, unspecified chronicity, unspecified gastritis type         Disposition:  Discharge  4/13/2024  2:01 pm    Follow-up:  Karmen Keller, ADA  [address] [facility] [address]  [phone]    In 1 week  And see you GI doctor as scheduled next week        Medications Prescribed:  Current Discharge Medication List

## 2024-04-15 ENCOUNTER — TELEPHONE (OUTPATIENT)
Facility: CLINIC | Age: 29
End: 2024-04-15

## 2024-04-15 NOTE — TELEPHONE ENCOUNTER
Received a call from PAT    [facility]    Pt told the PAT nurse that she has random episodes of tachycardia    The anesthesiologist recommended rescheduling patient to EM for safety reasons.    GI schedulers -  Can you please assist with rescheduling patient to [facility]?    Thank you      Just MARILYN Patricia

## 2024-04-15 NOTE — TELEPHONE ENCOUNTER
Called patient - rescheduled her EGD with Dr. Enciso to 4/25/2024 at [facility].    Please refer to DOMINICK dated 1/16/2024 for scheduling orders.    TYANIQUE closed.

## 2024-04-17 ENCOUNTER — TELEPHONE (OUTPATIENT)
Facility: CLINIC | Age: 29
End: 2024-04-17

## 2024-04-18 ENCOUNTER — OFFICE VISIT (OUTPATIENT)
Dept: FAMILY MEDICINE CLINIC | Facility: CLINIC | Age: 29
End: 2024-04-18
Payer: COMMERCIAL

## 2024-04-18 ENCOUNTER — LAB ENCOUNTER (OUTPATIENT)
Dept: LAB | Age: 29
End: 2024-04-18
Attending: FAMILY MEDICINE
Payer: COMMERCIAL

## 2024-04-18 VITALS
SYSTOLIC BLOOD PRESSURE: 110 MMHG | BODY MASS INDEX: 27.36 KG/M2 | WEIGHT: 202 LBS | HEART RATE: 85 BPM | DIASTOLIC BLOOD PRESSURE: 70 MMHG | OXYGEN SATURATION: 99 % | TEMPERATURE: 97 F | HEIGHT: 72 IN | RESPIRATION RATE: 18 BRPM

## 2024-04-18 DIAGNOSIS — Z00.00 ENCOUNTER FOR ANNUAL PHYSICAL EXAM: ICD-10-CM

## 2024-04-18 DIAGNOSIS — F41.9 ANXIETY: ICD-10-CM

## 2024-04-18 DIAGNOSIS — R00.2 PALPITATIONS: Primary | ICD-10-CM

## 2024-04-18 LAB
ALBUMIN SERPL-MCNC: 3.9 G/DL (ref 3.4–5)
ALBUMIN/GLOB SERPL: 1 {RATIO} (ref 1–2)
ALP LIVER SERPL-CCNC: 56 U/L
ALT SERPL-CCNC: 20 U/L
ANION GAP SERPL CALC-SCNC: 5 MMOL/L (ref 0–18)
AST SERPL-CCNC: 14 U/L (ref 15–37)
BASOPHILS # BLD AUTO: 0.03 X10(3) UL (ref 0–0.2)
BASOPHILS NFR BLD AUTO: 0.4 %
BILIRUB SERPL-MCNC: 0.5 MG/DL (ref 0.1–2)
BUN BLD-MCNC: 7 MG/DL (ref 9–23)
CALCIUM BLD-MCNC: 9.6 MG/DL (ref 8.5–10.1)
CHLORIDE SERPL-SCNC: 106 MMOL/L (ref 98–112)
CHOLEST SERPL-MCNC: 143 MG/DL (ref ?–200)
CO2 SERPL-SCNC: 27 MMOL/L (ref 21–32)
CREAT BLD-MCNC: 0.84 MG/DL
EGFRCR SERPLBLD CKD-EPI 2021: 96 ML/MIN/1.73M2 (ref 60–?)
EOSINOPHIL # BLD AUTO: 0.09 X10(3) UL (ref 0–0.7)
EOSINOPHIL NFR BLD AUTO: 1.3 %
ERYTHROCYTE [DISTWIDTH] IN BLOOD BY AUTOMATED COUNT: 12.7 %
FASTING PATIENT LIPID ANSWER: YES
FASTING STATUS PATIENT QL REPORTED: YES
GLOBULIN PLAS-MCNC: 4 G/DL (ref 2.8–4.4)
GLUCOSE BLD-MCNC: 86 MG/DL (ref 70–99)
HCT VFR BLD AUTO: 38.9 %
HDLC SERPL-MCNC: 50 MG/DL (ref 40–59)
HGB BLD-MCNC: 13.5 G/DL
IMM GRANULOCYTES # BLD AUTO: 0.02 X10(3) UL (ref 0–1)
IMM GRANULOCYTES NFR BLD: 0.3 %
LDLC SERPL CALC-MCNC: 76 MG/DL (ref ?–100)
LYMPHOCYTES # BLD AUTO: 1.98 X10(3) UL (ref 1–4)
LYMPHOCYTES NFR BLD AUTO: 27.7 %
MCH RBC QN AUTO: 29.8 PG (ref 26–34)
MCHC RBC AUTO-ENTMCNC: 34.7 G/DL (ref 31–37)
MCV RBC AUTO: 85.9 FL
MONOCYTES # BLD AUTO: 0.54 X10(3) UL (ref 0.1–1)
MONOCYTES NFR BLD AUTO: 7.6 %
NEUTROPHILS # BLD AUTO: 4.48 X10 (3) UL (ref 1.5–7.7)
NEUTROPHILS # BLD AUTO: 4.48 X10(3) UL (ref 1.5–7.7)
NEUTROPHILS NFR BLD AUTO: 62.7 %
NONHDLC SERPL-MCNC: 93 MG/DL (ref ?–130)
OSMOLALITY SERPL CALC.SUM OF ELEC: 283 MOSM/KG (ref 275–295)
PLATELET # BLD AUTO: 271 10(3)UL (ref 150–450)
POTASSIUM SERPL-SCNC: 4.1 MMOL/L (ref 3.5–5.1)
PROT SERPL-MCNC: 7.9 G/DL (ref 6.4–8.2)
RBC # BLD AUTO: 4.53 X10(6)UL
SODIUM SERPL-SCNC: 138 MMOL/L (ref 136–145)
T4 FREE SERPL-MCNC: 1.2 NG/DL (ref 0.8–1.7)
TRIGL SERPL-MCNC: 88 MG/DL (ref 30–149)
TSI SER-ACNC: 2.3 MIU/ML (ref 0.36–3.74)
VLDLC SERPL CALC-MCNC: 14 MG/DL (ref 0–30)
WBC # BLD AUTO: 7.1 X10(3) UL (ref 4–11)

## 2024-04-18 PROCEDURE — 3074F SYST BP LT 130 MM HG: CPT | Performed by: FAMILY MEDICINE

## 2024-04-18 PROCEDURE — 3008F BODY MASS INDEX DOCD: CPT | Performed by: FAMILY MEDICINE

## 2024-04-18 PROCEDURE — 99214 OFFICE O/P EST MOD 30 MIN: CPT | Performed by: FAMILY MEDICINE

## 2024-04-18 PROCEDURE — 80050 GENERAL HEALTH PANEL: CPT | Performed by: FAMILY MEDICINE

## 2024-04-18 PROCEDURE — 84439 ASSAY OF FREE THYROXINE: CPT | Performed by: FAMILY MEDICINE

## 2024-04-18 PROCEDURE — 3078F DIAST BP <80 MM HG: CPT | Performed by: FAMILY MEDICINE

## 2024-04-18 PROCEDURE — 80061 LIPID PANEL: CPT | Performed by: FAMILY MEDICINE

## 2024-04-18 RX ORDER — METOPROLOL SUCCINATE 25 MG/1
25 TABLET, EXTENDED RELEASE ORAL DAILY
Qty: 30 TABLET | Refills: 2 | Status: SHIPPED | OUTPATIENT
Start: 2024-04-18

## 2024-04-18 NOTE — TELEPHONE ENCOUNTER
Yes, you are right    It was routed to me as a prior auth but she just needs a refill.     Sorry about that

## 2024-04-18 NOTE — TELEPHONE ENCOUNTER
Dr. Patricia    I started the PA for Linzess but it looks like the ordering provider is from an urgent care.    Just want to make you aware before I proceed

## 2024-04-18 NOTE — PROGRESS NOTES
/70   Pulse 85   Temp 97.3 °F (36.3 °C) (Temporal)   Resp 18   Ht 6' 1\" (1.854 m)   Wt 202 lb (91.6 kg)   SpO2 99%   BMI 26.65 kg/m²               Chief Complaint   Patient presents with    Rapid Heart Beat        HPI;    Re Jackson is a 28 year old adult.  Who is here today complaining of higher heart rate with minimal exertion  Patient states that since COVID 19 the heart rate has been running high  Her heart rate went she is laying down and wakes up in the morning is in the 70s but goes up to 120 when she stands up and walks  Sometimes she gets dizzy  Her heart rate today is 85 and she is asymptomatic  Review of her medications revealed that the patient is on high-dose spironolactone and takes propranolol 10 mg as needed for anxiety/palpitations.  Denies any chest pain or shortness of breath      Wt Readings from Last 3 Encounters:   04/18/24 202 lb (91.6 kg)   04/13/24 200 lb (90.7 kg)   03/15/24 202 lb (91.6 kg)     BP Readings from Last 3 Encounters:   04/18/24 110/70   04/13/24 134/86   03/15/24 119/79         ALLERGIES:  Allergies   Allergen Reactions    Dander HIVES     Dogs & cats. Some dog breeds = Hives; but usually, just sneezing    Adhesive Tape RASH     Current Outpatient Medications   Medication Sig Dispense Refill    metoprolol succinate ER 25 MG Oral Tablet 24 Hr Take 1 tablet (25 mg total) by mouth daily. 30 tablet 2    linaCLOtide (LINZESS OR) Take by mouth.      mupirocin 2 % External Ointment Apply 1 Application topically daily. (Patient taking differently: Apply 1 Application topically daily. As needed) 1 each 0    clotrimazole 1 % External Cream Apply 1 Application topically daily. (Patient taking differently: Apply 1 Application topically daily. As needed) 1 each 0    Acetaminophen (TYLENOL OR)       IBUPROFEN OR       Progesterone Micronized 100 MG Oral Cap Take 1 capsule (100 mg total) by mouth daily.      spironolactone 100 MG Oral Tab Take 1 tablet (100 mg total) by  mouth daily.      estradiol 2 MG Oral Tab Take 2 tablets (4 mg total) by mouth daily.      Melatonin 10 MG Oral Cap Take 1 tablet by mouth nightly as needed. 6mg      Emtricitabine-Tenofovir DF (TRUVADA OR) Take by mouth. (Patient not taking: Reported on 12/17/2023)        Past Medical History:    Acid reflux    Arrhythmia    elevated heart rate since covid    Esophageal reflux    High blood pressure    Spermatocele      Social History:  Social History     Socioeconomic History    Marital status:    Tobacco Use    Smoking status: Former    Smokeless tobacco: Never    Tobacco comments:     only for 3 months in 2017   Vaping Use    Vaping status: Never Used   Substance and Sexual Activity    Alcohol use: Not Currently     Alcohol/week: 6.0 standard drinks of alcohol     Types: 6 Standard drinks or equivalent per week     Comment: socially    Drug use: Yes     Frequency: 3.0 times per week     Types: Cannabis   Other Topics Concern    Caffeine Concern Yes     Comment: 2 can of coke every other day.     Exercise Yes     Comment: Takes long walks 3 times weekly.    Social History Narrative    Single    Work- [employer]-     Grad student.      Social Determinants of Health     Financial Resource Strain: Not on File (10/8/2022)    [facility] from [facility]     Financial Resource Strain     Financial Resource Strain: 0   Food Insecurity: Not on File (10/8/2022)    [facility] from [facility]     Food Insecurity     Food: 0   Transportation Needs: Not on File (10/8/2022)    [facility] from [facility]     Transportation Needs     Transportation: 0   Physical Activity: Not on File (10/8/2022)    [facility] from [facility]     Physical Activity     Physical Activity: 0   Stress: Not on File (10/8/2022)    [facility] from [facility]     Stress     Stress: 0   Social Connections: Not on File (10/8/2022)    [facility] from [facility]     Social Connections     Social Connections and Isolation: 0    Housing Stability: Not on File (10/8/2022)    [facility] from [facility]     Housing Stability     Housin        REVIEW OF SYSTEMS:   GENERAL HEALTH: feels well otherwise  SKIN: denies any unusual skin lesions or rashes  RESPIRATORY: denies shortness of breath with exertion  CARDIOVASCULAR: denies chest pain on exertion  GI: denies abdominal pain and denies heartburn  NEURO: denies headaches  EXAM:   /70   Pulse 85   Temp 97.3 °F (36.3 °C) (Temporal)   Resp 18   Ht 6' 1\" (1.854 m)   Wt 202 lb (91.6 kg)   SpO2 99%   BMI 26.65 kg/m²   GENERAL: well developed, well nourished,in no apparent distress  SKIN: no rashes,no suspicious lesions  HEENT: atraumatic, normocephalic,ears and throat are clear  NECK: supple,no adenopathy,no bruits  LUNGS: clear to auscultation  CARDIO: RRR without murmur  GI: good BS's,no masses, HSM or tenderness  EXTREMITIES: no cyanosis, clubbing or edema    ASSESSMENT AND PLAN:   Diagnoses and all orders for this visit:    Palpitations  Anxiety  In my opinion patients tachycardia is possibly due to dehydration as she is on high-dose spironolactone  She swears that she keeps herself hydrated  My plan at this time is to discontinue propranolol  Changing it to metoprolol succinate 25 mg once a day daily scheduled  Continue to keep herself hydrated    -     metoprolol succinate ER 25 MG Oral Tablet 24 Hr; Take 1 tablet (25 mg total) by mouth daily.  Follow-up in a month  Time spent at appointment today is 30 minutes including preparing to see patient, reviewing test results, performing medically appropriate examination and evaluation and coordinating care, counseling and educating patient/family, ordering medications and testing, and documenting clinical information in EMR.     The patient indicates understanding of these issues and agrees to the plan.  Imaging & Consults:  None  Meds & Refills for this Visit:  Requested Prescriptions     Signed Prescriptions Disp Refills     metoprolol succinate ER 25 MG Oral Tablet 24 Hr 30 tablet 2     Sig: Take 1 tablet (25 mg total) by mouth daily.     No orders of the defined types were placed in this encounter.            Ryley Chandra MD   [facility]  [address] [address]    Electronically signed    This dictation was performed with a verbal recognition program (DRAGON) and it was checked for errors. It is possible that there are still dictated errors within this office note. If so, please bring any errors to my attention for an addendum. All efforts were made to ensure that this office note is accurate

## 2024-04-25 ENCOUNTER — HOSPITAL ENCOUNTER (OUTPATIENT)
Facility: HOSPITAL | Age: 29
Setting detail: HOSPITAL OUTPATIENT SURGERY
Discharge: HOME | End: 2024-04-25
Attending: INTERNAL MEDICINE | Admitting: INTERNAL MEDICINE
Payer: COMMERCIAL

## 2024-04-25 ENCOUNTER — ANESTHESIA EVENT (OUTPATIENT)
Dept: ENDOSCOPY | Facility: HOSPITAL | Age: 29
End: 2024-04-25
Payer: COMMERCIAL

## 2024-04-25 ENCOUNTER — ANESTHESIA (OUTPATIENT)
Dept: ENDOSCOPY | Facility: HOSPITAL | Age: 29
End: 2024-04-25
Payer: COMMERCIAL

## 2024-04-25 VITALS
SYSTOLIC BLOOD PRESSURE: 107 MMHG | HEIGHT: 72 IN | WEIGHT: 200 LBS | RESPIRATION RATE: 23 BRPM | DIASTOLIC BLOOD PRESSURE: 74 MMHG | HEART RATE: 66 BPM | TEMPERATURE: 97 F | OXYGEN SATURATION: 99 % | BODY MASS INDEX: 27.09 KG/M2

## 2024-04-25 DIAGNOSIS — K21.9 GASTROESOPHAGEAL REFLUX DISEASE, UNSPECIFIED WHETHER ESOPHAGITIS PRESENT: ICD-10-CM

## 2024-04-25 DIAGNOSIS — R10.13 EPIGASTRIC PAIN: ICD-10-CM

## 2024-04-25 DIAGNOSIS — R10.12 LUQ PAIN: ICD-10-CM

## 2024-04-25 PROCEDURE — 0DB78ZX EXCISION OF STOMACH, PYLORUS, VIA NATURAL OR ARTIFICIAL OPENING ENDOSCOPIC, DIAGNOSTIC: ICD-10-PCS | Performed by: INTERNAL MEDICINE

## 2024-04-25 PROCEDURE — 43239 EGD BIOPSY SINGLE/MULTIPLE: CPT | Performed by: INTERNAL MEDICINE

## 2024-04-25 PROCEDURE — 0DB98ZX EXCISION OF DUODENUM, VIA NATURAL OR ARTIFICIAL OPENING ENDOSCOPIC, DIAGNOSTIC: ICD-10-PCS | Performed by: INTERNAL MEDICINE

## 2024-04-25 RX ORDER — LIDOCAINE HYDROCHLORIDE 10 MG/ML
INJECTION, SOLUTION EPIDURAL; INFILTRATION; INTRACAUDAL; PERINEURAL AS NEEDED
Status: DISCONTINUED | OUTPATIENT
Start: 2024-04-25 | End: 2024-04-25 | Stop reason: SURG

## 2024-04-25 RX ORDER — GLYCOPYRROLATE 0.2 MG/ML
INJECTION, SOLUTION INTRAMUSCULAR; INTRAVENOUS AS NEEDED
Status: DISCONTINUED | OUTPATIENT
Start: 2024-04-25 | End: 2024-04-25 | Stop reason: SURG

## 2024-04-25 RX ORDER — NALOXONE HYDROCHLORIDE 0.4 MG/ML
0.08 INJECTION, SOLUTION INTRAMUSCULAR; INTRAVENOUS; SUBCUTANEOUS ONCE AS NEEDED
Status: DISCONTINUED | OUTPATIENT
Start: 2024-04-25 | End: 2024-04-25

## 2024-04-25 RX ORDER — SODIUM CHLORIDE, SODIUM LACTATE, POTASSIUM CHLORIDE, CALCIUM CHLORIDE 600; 310; 30; 20 MG/100ML; MG/100ML; MG/100ML; MG/100ML
INJECTION, SOLUTION INTRAVENOUS CONTINUOUS
Status: DISCONTINUED | OUTPATIENT
Start: 2024-04-25 | End: 2024-04-25

## 2024-04-25 RX ADMIN — SODIUM CHLORIDE, SODIUM LACTATE, POTASSIUM CHLORIDE, CALCIUM CHLORIDE: 600; 310; 30; 20 INJECTION, SOLUTION INTRAVENOUS at 11:42:00

## 2024-04-25 RX ADMIN — GLYCOPYRROLATE 0.2 MG: 0.2 INJECTION, SOLUTION INTRAMUSCULAR; INTRAVENOUS at 11:23:00

## 2024-04-25 RX ADMIN — SODIUM CHLORIDE, SODIUM LACTATE, POTASSIUM CHLORIDE, CALCIUM CHLORIDE: 600; 310; 30; 20 INJECTION, SOLUTION INTRAVENOUS at 11:22:00

## 2024-04-25 RX ADMIN — LIDOCAINE HYDROCHLORIDE 50 MG: 10 INJECTION, SOLUTION EPIDURAL; INFILTRATION; INTRACAUDAL; PERINEURAL at 11:26:00

## 2024-04-25 NOTE — ANESTHESIA PREPROCEDURE EVALUATION
Anesthesia PreOp Note    HPI:     Re Jackson is a 28 year old adult who presents for preoperative consultation requested by: Belem Enciso MD    Date of Surgery: 4/25/2024    Procedure(s):  ESOPHAGOGASTRODUODENOSCOPY  Indication: Gastroesophageal reflux disease, unspecified whether esophagitis present /Epigastric pain /LUQ pain    Relevant Problems   No relevant active problems       NPO:  Last Liquid Consumption Date: 04/25/24  Last Liquid Consumption Time: 0100  Last Solid Consumption Date: 04/24/24  Last Solid Consumption Time: 1900  Last Liquid Consumption Date: 04/25/24          History Review:  Patient Active Problem List    Diagnosis Date Noted    Palpitations 10/05/2022    Abnormal ECG 10/05/2022    Right groin pain 09/09/2021    Gender dysphoria in adult 12/09/2020    Costochondritis 08/05/2019    Complicated migraine 07/03/2019    Abnormal CT of the chest 06/21/2019    Right kidney stone 06/21/2019    Cyst of right kidney 06/21/2019    Acute mesenteric adenitis 06/21/2019    Umbilical hernia without obstruction and without gangrene 06/21/2019    Acid reflux     Lipoma of back 01/03/2017    Other allergy, other than to medicinal agents 06/29/2012       Past Medical History:    Acid reflux    Arrhythmia    elevated heart rate since covid    Esophageal reflux    High blood pressure    Spermatocele       Past Surgical History:   Procedure Laterality Date    Colonoscopy      Other      lipoma removal, L upper shoulder    Other      wisdom teeth     Other      lipoma removed from neck in 2016       Medications Prior to Admission   Medication Sig Dispense Refill Last Dose    metoprolol succinate ER 25 MG Oral Tablet 24 Hr Take 1 tablet (25 mg total) by mouth daily. 30 tablet 2 4/24/2024    linaCLOtide (LINZESS OR) Take by mouth.   prn    mupirocin 2 % External Ointment Apply 1 Application topically daily. (Patient taking differently: Apply 1 Application topically daily. As needed) 1 each 0  2024    clotrimazole 1 % External Cream Apply 1 Application topically daily. (Patient taking differently: Apply 1 Application topically daily. As needed) 1 each 0 2024    Acetaminophen (TYLENOL OR)    prn    IBUPROFEN OR    prn    Progesterone Micronized 100 MG Oral Cap Take 1 capsule (100 mg total) by mouth daily.   2024    spironolactone 100 MG Oral Tab Take 1 tablet (100 mg total) by mouth daily.   2024    estradiol 2 MG Oral Tab Take 2 tablets (4 mg total) by mouth daily.   2024    Melatonin 10 MG Oral Cap Take 1 tablet by mouth nightly as needed. 6mg   prn    [] penicillin v potassium 500 MG Oral Tab Take 1 tablet (500 mg total) by mouth 4 (four) times daily for 7 days. (Patient not taking: Reported on 4/15/2024) 28 tablet 0     [] linaCLOtide (LINZESS) 72 MCG Oral Cap Take 72 mcg by mouth daily. 90 capsule 2     [] Omeprazole 40 MG Oral Capsule Delayed Release Take 1 capsule (40 mg total) by mouth 2 (two) times daily before meals. 180 capsule 0      Current Facility-Administered Medications Ordered in Epic   Medication Dose Route Frequency Provider Last Rate Last Admin    lactated ringers infusion   Intravenous Continuous Belem Enciso MD         No current [facility]-ordered outpatient medications on file.       Allergies   Allergen Reactions    Dander HIVES     Dogs & cats. Some dog breeds = Hives; but usually, just sneezing    Adhesive Tape RASH       Family History   Problem Relation Age of Onset    Asthma Mother     Anxiety Mother     Depression Father         SSRI    Hypertension Father     Other (sleep apnea) Father     Cancer Maternal Grandmother     Alcohol and Other Disorders Associated Maternal Grandmother     Cancer Maternal Grandfather     Alcohol and Other Disorders Associated Maternal Grandfather     Diabetes Paternal Grandfather     Heart Disorder Paternal Grandfather     OCD Sister     Depression Brother      Social History      Socioeconomic History    Marital status:    Tobacco Use    Smoking status: Former    Smokeless tobacco: Never    Tobacco comments:     only for 3 months in 2017   Vaping Use    Vaping status: Never Used   Substance and Sexual Activity    Alcohol use: Not Currently     Alcohol/week: 6.0 standard drinks of alcohol     Types: 6 Standard drinks or equivalent per week     Comment: not currently    Drug use: Yes     Frequency: 3.0 times per week     Types: Cannabis   Other Topics Concern    Caffeine Concern Yes     Comment: 2 can of coke every other day.     Exercise Yes     Comment: Takes long walks 3 times weekly.        Available pre-op labs reviewed.  Lab Results   Component Value Date    WBC 7.1 04/18/2024    RBC 4.53 04/18/2024    HGB 13.5 04/18/2024    HCT 38.9 04/18/2024    MCV 85.9 04/18/2024    MCH 29.8 04/18/2024    MCHC 34.7 04/18/2024    RDW 12.7 04/18/2024    .0 04/18/2024     Lab Results   Component Value Date     04/18/2024    K 4.1 04/18/2024     04/18/2024    CO2 27.0 04/18/2024    BUN 7 (L) 04/18/2024    CREATSERUM 0.84 04/18/2024    GLU 86 04/18/2024    CA 9.6 04/18/2024          Vital Signs:  Body mass index is 26.39 kg/m².   height is 1.854 m (6' 1\") and weight is 90.7 kg (200 lb). Her temperature is 97 °F (36.1 °C). Her blood pressure is 112/73 and her pulse is 75. Her respiration is 14 and oxygen saturation is 99%.   Vitals:    04/23/24 1010 04/25/24 1030   BP:  112/73   Pulse:  75   Resp:  14   Temp:  97 °F (36.1 °C)   SpO2:  99%   Weight: 90.7 kg (200 lb)    Height: 1.854 m (6' 1\")         Anesthesia Evaluation     Patient summary reviewed and Nursing notes reviewed    No history of anesthetic complications   Airway   Mallampati: I  TM distance: >3 FB  Neck ROM: full  Dental - Dentition appears grossly intact     Pulmonary - negative ROS and normal exam   Cardiovascular - normal exam  Exercise tolerance: good  (-) past MI    ROS comment: Takes metoprolol for  tachycardia    Neuro/Psych - negative ROS     GI/Hepatic/Renal    (+) GERD well controlled  (-) liver disease, renal disease, bowel prep    Endo/Other - negative ROS   Abdominal  - normal exam                 Anesthesia Plan:   ASA:  2  Plan:   General  Informed Consent Plan and Risks Discussed With:  Patient and Other (life partner at bedside)      I have informed Re Jackson and/or legal guardian or family member of the nature of the anesthetic plan, benefits, risks including possible dental damage if relevant, major complications, and any alternative forms of anesthetic management.   All of the patient's questions were answered to the best of my ability. The patient desires the anesthetic management as planned.  Gabby Lugo CRNA  4/25/2024 10:53 AM  Present on Admission:  **None**

## 2024-04-25 NOTE — H&P
History & Physical Examination    Patient Name: Re Jackson  MRN: [id number]  CSN: [id number]  YOB: 1995    Diagnosis: epigastric and LUQ pain, EGD today    Medications Prior to Admission   Medication Sig Dispense Refill Last Dose    metoprolol succinate ER 25 MG Oral Tablet 24 Hr Take 1 tablet (25 mg total) by mouth daily. 30 tablet 2     linaCLOtide (LINZESS OR) Take by mouth.   prn    Acetaminophen (TYLENOL OR)    prn    IBUPROFEN OR    prn    Progesterone Micronized 100 MG Oral Cap Take 1 capsule (100 mg total) by mouth daily.       spironolactone 100 MG Oral Tab Take 1 tablet (100 mg total) by mouth daily.       estradiol 2 MG Oral Tab Take 2 tablets (4 mg total) by mouth daily.       Melatonin 10 MG Oral Cap Take 1 tablet by mouth nightly as needed. 6mg   prn    [] penicillin v potassium 500 MG Oral Tab Take 1 tablet (500 mg total) by mouth 4 (four) times daily for 7 days. (Patient not taking: Reported on 4/15/2024) 28 tablet 0     mupirocin 2 % External Ointment Apply 1 Application topically daily. (Patient taking differently: Apply 1 Application topically daily. As needed) 1 each 0     clotrimazole 1 % External Cream Apply 1 Application topically daily. (Patient taking differently: Apply 1 Application topically daily. As needed) 1 each 0     [] linaCLOtide (LINZESS) 72 MCG Oral Cap Take 72 mcg by mouth daily. 90 capsule 2     [] Omeprazole 40 MG Oral Capsule Delayed Release Take 1 capsule (40 mg total) by mouth 2 (two) times daily before meals. 180 capsule 0     Emtricitabine-Tenofovir DF (TRUVADA OR) Take by mouth. (Patient not taking: Reported on 2023)        Current Facility-Administered Medications   Medication Dose Route Frequency    lactated ringers infusion   Intravenous Continuous       Allergies:   Allergies   Allergen Reactions    Dander HIVES     Dogs & cats. Some dog breeds = Hives; but usually, just sneezing    Adhesive Tape RASH       Past Medical  History:    Acid reflux    Arrhythmia    elevated heart rate since covid    Esophageal reflux    High blood pressure    Spermatocele     Past Surgical History:   Procedure Laterality Date    Colonoscopy      Other      lipoma removal, L upper shoulder    Other      wisdom teeth     Other      lipoma removed from neck in 2016     Family History   Problem Relation Age of Onset    Asthma Mother     Anxiety Mother     Depression Father         SSRI    Hypertension Father     Other (sleep apnea) Father     Cancer Maternal Grandmother     Alcohol and Other Disorders Associated Maternal Grandmother     Cancer Maternal Grandfather     Alcohol and Other Disorders Associated Maternal Grandfather     Diabetes Paternal Grandfather     Heart Disorder Paternal Grandfather     OCD Sister     Depression Brother      Social History     Tobacco Use    Smoking status: Former    Smokeless tobacco: Never    Tobacco comments:     only for 3 months in 2017   Substance Use Topics    Alcohol use: Not Currently     Alcohol/week: 6.0 standard drinks of alcohol     Types: 6 Standard drinks or equivalent per week     Comment: not currently       SYSTEM Check if Review is Normal Check if Physical Exam is Normal If not normal, please explain:   HEENT [X ] [ X]    NECK  [X ] [ X]    HEART [X ] [ X]    LUNGS [X ] [ X]    ABDOMEN [X ] [ X]    EXTREMITIES [X ] [ X]    OTHER        I have discussed the risks and benefits and alternatives of the procedure with the patient/family.  They understand and agree to proceed with plan of care.   I have reviewed the History and Physical done within the last 30 days.  Any changes noted above.    Belem Enciso MD  [facility]

## 2024-04-25 NOTE — OR PREOP
Patient has ongoing allergy symptoms No cough or chest pain.. Took home covid test yesterday which was negative. Millie Lugo notified No need to repeat at this time. Dr meyer notifed

## 2024-04-25 NOTE — ANESTHESIA POSTPROCEDURE EVALUATION
Patient: Re Jackson    Procedure Summary       Date: 04/25/24 Room / Location: [facility] ENDOSCOPY 05 / [facility] ENDOSCOPY    Anesthesia Start: 1122 Anesthesia Stop:     Procedure: ESOPHAGOGASTRODUODENOSCOPY Diagnosis:       Gastroesophageal reflux disease, unspecified whether esophagitis present      Epigastric pain      LUQ pain      (Normal EGD)    Surgeons: Belem Enciso MD Anesthesiologist: Gabby Lugo CRNA    Anesthesia Type: general ASA Status: 2            Anesthesia Type: general    Vitals Value Taken Time   /68 04/25/24 1142   Temp N/a   04/25/24 1142   Pulse 78 04/25/24 1142   Resp 12 04/25/24 1142   SpO2 98 % 04/25/24 1142       [facility] AN Post Evaluation:   Patient Evaluated in PACU  Patient Participation: complete - patient participated  Level of Consciousness: sleepy but conscious  Pain Management: adequate  Airway Patency:patent  Yes    Cardiovascular Status: acceptable  Respiratory Status: acceptable and room air  Postoperative Hydration acceptable      Gabby Lugo CRNA  4/25/2024 11:42 AM

## 2024-04-25 NOTE — DISCHARGE INSTRUCTIONS
Home Care Instructions for Gastroscopy with Sedation    Diet:  - Resume your regular diet as tolerated unless otherwise instructed.  - Start with light meals to minimize bloating.  - Do not drink alcohol today.    Medication:  - If you have questions about resuming your normal medications, please contact your Primary Care Physician.    Activities:  - Take it easy today. Do not return to work today.  - Do not drive today.  - Do not operate any machinery today (including kitchen equipment).    Gastroscopy:  - You may have a sore throat for 2-3 days following the exam. This is normal. Gargling with warm salt water (1/2 tsp salt to 1 glass warm water) or using throat lozenges will help.  - If you experience any sharp pain in your neck, abdomen or chest, vomiting of blood, oral temperature over 100 degrees Fahrenheit, light-headedness or dizziness, or any other problems, contact your doctor.    **If unable to reach your doctor, please go to the [facility] Emergency Room**    - Your referring physician will receive a full report of your examination.  - If you do not hear from your doctor's office within two weeks of your biopsy, please call them for your results.    You may be able to see your laboratory results in [facility] between 4 and 7 business days.  In some cases, your physician may not have viewed the results before they are released to [facility].  If you have questions regarding your results contact the physician who ordered the test/exam by phone or via [facility] by choosing \"Ask a Medical Question.\"

## 2024-04-25 NOTE — OPERATIVE REPORT
ESOPHAGOGASTRODUODENOSCOPY (EGD) REPORT    Re Jackson     1995 Age 28 year old   PCP ADA MARAVILLA Endoscopist Belem Enciso MD       Date of procedure: 24    Procedure: EGD w/ cold forceps biopsies     Pre-operative diagnosis: epigastric and LUQ pain     Post-operative diagnosis: normal    Medications: MAC    Complications: none    Procedure:  Informed consent was obtained from the patient after the risks of the procedure were discussed, including but not limited to bleeding, perforation, aspiration, infection, or possibility of a missed lesion. After discussions of the risks/benefits and alternatives to this procedure, as well as the planned sedation, the patient was placed in the left lateral decubitus position and begun on continuous blood pressure pulse oximetry and EKG monitoring and this was maintained throughout the procedure. Once an adequate level of sedation was obtained a bite block was placed. Then the lubricated tip of the [id number] diagnostic video upper endoscope was inserted and advanced using direct visualization into the posterior pharynx and ultimately into the esophagus. The scope was then advanced through the stomach and to the second portion of the duodenum.    Complications: None    Findings:      1. Esophagus: The squamocolumnar junction was noted at 43 cm and appeared regular. The diaphragmatic pinch was noted noted at 43 cm from the incisors. The esophageal mucosa appeared normal. There was no endoscopic findings of esophagitis, stricture or Ojeda's esophagus.    2. Stomach: The stomach distended normally. Normal rugal folds were seen. The pylorus was patent. Retroflexion revealed a normal fundus. The gastric mucosa appeared normal. Biopsies were taken with a cold forceps from the antrum, incisura and body for histology.     3. Duodenum: The duodenal mucosa appeared normal in the bulb and 2nd portion of the duodenum. Biopsied.      Impression:  -Esophagus: Normal.   -Stomach: Normal. Biopsied.   -Duodenum: Normal. Biopsied.     Recommend:  1. Await pathology.     >>>If tissue was sampled/removed and you have not received your pathology results either by phone or letter within 2 weeks, please call our office at [phone].    Specimens:  Duodenal biopsies, gastric biopsies     Blood loss: <1 ml

## 2024-05-06 ENCOUNTER — OFFICE VISIT (OUTPATIENT)
Dept: PODIATRY CLINIC | Facility: CLINIC | Age: 29
End: 2024-05-06

## 2024-05-06 DIAGNOSIS — M79.675 GREAT TOE PAIN, LEFT: ICD-10-CM

## 2024-05-06 DIAGNOSIS — M79.674 GREAT TOE PAIN, RIGHT: ICD-10-CM

## 2024-05-06 DIAGNOSIS — L60.0 INGROWN TOENAIL OF BOTH FEET: Primary | ICD-10-CM

## 2024-05-06 PROCEDURE — 99203 OFFICE O/P NEW LOW 30 MIN: CPT | Performed by: PODIATRIST

## 2024-05-06 NOTE — PROGRESS NOTES
[facility]  Progress Note    Re Jackson is a 28 year old adult.   Chief Complaint   Patient presents with    Ingrown Toenail     Consult - B/l  hallux , b/l borders - Pt states reoccurring ingrown nails. Infection on and off. No pain today.         HPI:     This is a pleasant female that presents to clinic today due to painful bilateral hallux ingrown toenails bilateral borders.  She denies any drainage.  She has been soaking in epsom salts which does help.        Allergies: Dander and Adhesive tape   Current Outpatient Medications   Medication Sig Dispense Refill    linaCLOtide (LINZESS OR) Take by mouth.      mupirocin 2 % External Ointment Apply 1 Application topically daily. (Patient taking differently: Apply 1 Application topically daily. As needed) 1 each 0    clotrimazole 1 % External Cream Apply 1 Application topically daily. (Patient taking differently: Apply 1 Application topically daily. As needed) 1 each 0    Acetaminophen (TYLENOL OR)       IBUPROFEN OR       Progesterone Micronized 100 MG Oral Cap Take 1 capsule (100 mg total) by mouth daily.      spironolactone 100 MG Oral Tab Take 1 tablet (100 mg total) by mouth daily.      estradiol 2 MG Oral Tab Take 2 tablets (4 mg total) by mouth daily.      Melatonin 10 MG Oral Cap Take 1 tablet by mouth nightly as needed. 6mg      metoprolol succinate ER 25 MG Oral Tablet 24 Hr Take 1 tablet (25 mg total) by mouth daily. (Patient not taking: Reported on 5/6/2024) 30 tablet 2      Past Medical History:    Acid reflux    Arrhythmia    elevated heart rate since covid    Esophageal reflux    High blood pressure    Spermatocele      Past Surgical History:   Procedure Laterality Date    Colonoscopy      Egd  04/25/2024    Dr. Enciso: Normal    Other      lipoma removal, L upper shoulder    Other      wisdom teeth     Other      lipoma removed from neck in 2016      Family History   Problem Relation Age of Onset    Asthma Mother     Anxiety  Mother     Depression Father         SSRI    Hypertension Father     Other (sleep apnea) Father     Cancer Maternal Grandmother     Alcohol and Other Disorders Associated Maternal Grandmother     Cancer Maternal Grandfather     Alcohol and Other Disorders Associated Maternal Grandfather     Diabetes Paternal Grandfather     Heart Disorder Paternal Grandfather     OCD Sister     Depression Brother       Social History     Socioeconomic History    Marital status:    Tobacco Use    Smoking status: Former    Smokeless tobacco: Never    Tobacco comments:     only for 3 months in 2017   Vaping Use    Vaping status: Never Used   Substance and Sexual Activity    Alcohol use: Not Currently     Alcohol/week: 6.0 standard drinks of alcohol     Types: 6 Standard drinks or equivalent per week     Comment: not currently    Drug use: Yes     Frequency: 3.0 times per week     Types: Cannabis   Other Topics Concern    Caffeine Concern Yes     Comment: 2 can of coke every other day.     Exercise Yes     Comment: Takes long walks 3 times weekly.            REVIEW OF SYSTEMS:   Denies nausea, fever, chills  No calf pain  No other muscle or joint aches  Denies chest pain or shortness of breath.      EXAM:   There were no vitals taken for this visit.    Constitutional:   Patient in no apparent distress. Well kept Of normal body habitus. Alert and oriented to person, place, and time.  Integumentary examination:   There are no varicosities.   Skin appears moist, warm, and supple with positive hair growth.     B/L hallux b/l nail borders are incurvated with no SOI    Vascular examination:   DP pulse is 2/4  PT pulse is 2/4  Capillary refill is immediate  Edema is not present bilateral.  Temperature warm proximally to warm distally bilateral.  Neurological examination:   Vibratory (128-Hz tuning fork) sensation is present to right and is present to left.  Sharp/dull is present to right and is present to left.  Musculoskeletal  examination:  Muscle Strength is 5/5.    POP to b/l hallux b/l nail borders      LABS & IMAGING:     Lab Results   Component Value Date    GLU 86 04/18/2024    BUN 7 (L) 04/18/2024    CREATSERUM 0.84 04/18/2024    ANIONGAP 5 04/18/2024    GFRAA 159 03/26/2022    GFRNAA 138 03/26/2022    CA 9.6 04/18/2024     04/18/2024    K 4.1 04/18/2024     04/18/2024    CO2 27.0 04/18/2024    OSMOCALC 283 04/18/2024        No results found for: \"EAG\", \"A1C\"     No results found.     ASSESSMENT AND PLAN:   Diagnoses and all orders for this visit:    Ingrown toenail of both feet    Great toe pain, left    Great toe pain, right        Plan:     Treatment options reviewed with the patient related to condition/diagnosis.  Discussed advantages and disadvantages as well as risks/potential complications related to nail surgery.    Pt would like to proceed with b/l hallux b/l nail border matrixectomy    RTC for b/l hallux b/l nail border matrixectomy    No follow-ups on file.    Frankie Keane DPM  5/6/2024

## 2024-05-28 ENCOUNTER — OFFICE VISIT (OUTPATIENT)
Dept: FAMILY MEDICINE CLINIC | Facility: CLINIC | Age: 29
End: 2024-05-28

## 2024-05-28 ENCOUNTER — LAB ENCOUNTER (OUTPATIENT)
Dept: LAB | Age: 29
End: 2024-05-28
Attending: FAMILY MEDICINE

## 2024-05-28 VITALS
BODY MASS INDEX: 27.5 KG/M2 | SYSTOLIC BLOOD PRESSURE: 124 MMHG | TEMPERATURE: 97 F | HEART RATE: 80 BPM | RESPIRATION RATE: 16 BRPM | HEIGHT: 72 IN | WEIGHT: 203 LBS | DIASTOLIC BLOOD PRESSURE: 66 MMHG | OXYGEN SATURATION: 99 %

## 2024-05-28 DIAGNOSIS — Z72.51 HIGH RISK SEXUAL BEHAVIOR, UNSPECIFIED TYPE: ICD-10-CM

## 2024-05-28 DIAGNOSIS — R00.2 PALPITATIONS: Primary | ICD-10-CM

## 2024-05-28 LAB
HBV SURFACE AB SER QL: NONREACTIVE
HBV SURFACE AB SERPL IA-ACNC: 4.96 MIU/ML
HBV SURFACE AG SER-ACNC: <0.1 [IU]/L
HBV SURFACE AG SERPL QL IA: NONREACTIVE
HCV AB SERPL QL IA: NONREACTIVE
T PALLIDUM AB SER QL IA: NONREACTIVE

## 2024-05-28 PROCEDURE — 3008F BODY MASS INDEX DOCD: CPT | Performed by: FAMILY MEDICINE

## 2024-05-28 PROCEDURE — 99213 OFFICE O/P EST LOW 20 MIN: CPT | Performed by: FAMILY MEDICINE

## 2024-05-28 PROCEDURE — 87389 HIV-1 AG W/HIV-1&-2 AB AG IA: CPT | Performed by: FAMILY MEDICINE

## 2024-05-28 PROCEDURE — 87491 CHLMYD TRACH DNA AMP PROBE: CPT | Performed by: FAMILY MEDICINE

## 2024-05-28 PROCEDURE — 86706 HEP B SURFACE ANTIBODY: CPT | Performed by: FAMILY MEDICINE

## 2024-05-28 PROCEDURE — 86780 TREPONEMA PALLIDUM: CPT | Performed by: FAMILY MEDICINE

## 2024-05-28 PROCEDURE — 86803 HEPATITIS C AB TEST: CPT | Performed by: FAMILY MEDICINE

## 2024-05-28 PROCEDURE — 3074F SYST BP LT 130 MM HG: CPT | Performed by: FAMILY MEDICINE

## 2024-05-28 PROCEDURE — 87340 HEPATITIS B SURFACE AG IA: CPT | Performed by: FAMILY MEDICINE

## 2024-05-28 PROCEDURE — 3078F DIAST BP <80 MM HG: CPT | Performed by: FAMILY MEDICINE

## 2024-05-28 PROCEDURE — 87591 N.GONORRHOEAE DNA AMP PROB: CPT | Performed by: FAMILY MEDICINE

## 2024-05-28 RX ORDER — POLYETHYLENE GLYCOL 3350 17 G/17G
17 POWDER, FOR SOLUTION ORAL DAILY
COMMUNITY

## 2024-05-28 RX ORDER — METOPROLOL SUCCINATE 25 MG/1
25 TABLET, EXTENDED RELEASE ORAL DAILY
Qty: 90 TABLET | Refills: 1 | Status: SHIPPED | OUTPATIENT
Start: 2024-05-28

## 2024-05-28 NOTE — PROGRESS NOTES
/66   Pulse 80   Temp 97.2 °F (36.2 °C) (Temporal)   Resp 16   Ht 6' 1\" (1.854 m)   Wt 203 lb (92.1 kg)   SpO2 99%   BMI 26.78 kg/m²               Chief Complaint   Patient presents with    Medication Follow-Up    STD     labs        HPI;    Re Jackson is a 28 year old adult.  Who is here today for follow-up, her heart rate was found to be elevated during the last few visits and patient had complained of increased resting heart rate.  She was taking propranolol as needed  During the last visit I discontinued propranolol and started her on metoprolol 25 mg daily which she has been taking diligently  The heart rate is much better, she no longer is getting palpitations  Has been following the heart rate on her Fitbit  Labs that were done were normal at the time including TSH and electrolytes      She is also here today requesting screening for sexually transmitted diseases, denies any symptoms  No pain or discharge  She is also onTRUVADA for HIV prevention        Wt Readings from Last 3 Encounters:   05/28/24 203 lb (92.1 kg)   04/23/24 200 lb (90.7 kg)   04/18/24 202 lb (91.6 kg)     BP Readings from Last 3 Encounters:   05/28/24 124/66   04/25/24 107/74   04/18/24 110/70         ALLERGIES:  Allergies   Allergen Reactions    Dander HIVES     Dogs & cats. Some dog breeds = Hives; but usually, just sneezing    Adhesive Tape RASH     Current Outpatient Medications   Medication Sig Dispense Refill    polyethylene glycol, PEG 3350, 17 g Oral Powd Pack Take 17 g by mouth daily.      metoprolol succinate ER 25 MG Oral Tablet 24 Hr Take 1 tablet (25 mg total) by mouth daily. 90 tablet 1    mupirocin 2 % External Ointment Apply 1 Application topically daily. (Patient taking differently: Apply 1 Application topically daily. As needed) 1 each 0    clotrimazole 1 % External Cream Apply 1 Application topically daily. (Patient taking differently: Apply 1 Application topically daily. As needed) 1 each 0     Acetaminophen (TYLENOL OR)       IBUPROFEN OR       Progesterone Micronized 100 MG Oral Cap Take 1 capsule (100 mg total) by mouth daily.      spironolactone 100 MG Oral Tab Take 1 tablet (100 mg total) by mouth daily.      estradiol 2 MG Oral Tab Take 2 tablets (4 mg total) by mouth daily.      Melatonin 10 MG Oral Cap Take 1 tablet by mouth nightly as needed. 6mg        Past Medical History:    Acid reflux    Arrhythmia    elevated heart rate since covid    Esophageal reflux    High blood pressure    Spermatocele      Social History:  Social History     Socioeconomic History    Marital status:    Tobacco Use    Smoking status: Former    Smokeless tobacco: Never    Tobacco comments:     only for 3 months in 2017   Vaping Use    Vaping status: Never Used   Substance and Sexual Activity    Alcohol use: Not Currently     Alcohol/week: 6.0 standard drinks of alcohol     Types: 6 Standard drinks or equivalent per week     Comment: not currently    Drug use: Yes     Frequency: 3.0 times per week     Types: Cannabis   Other Topics Concern    Caffeine Concern Yes     Comment: 2 can of coke every other day.     Exercise Yes     Comment: Takes long walks 3 times weekly.    Social History Narrative    Single    Work- [employer]-     Grad student.      Social Determinants of Health     Financial Resource Strain: Not on File (10/8/2022)    [facility] from [facility]     Financial Resource Strain     Financial Resource Strain: 0   Food Insecurity: Not on File (10/8/2022)    [facility] from [facility]     Food Insecurity     Food: 0   Transportation Needs: Not on File (10/8/2022)    Received from [facility]     Transportation Needs     Transportation: 0   Physical Activity: Not on File (10/8/2022)    [facility] from [facility]     Physical Activity     Physical Activity: 0   Stress: Not on File (10/8/2022)    [facility] from [facility]     Stress     Stress: 0   Social Connections: Not on File  (10/8/2022)    Received from [facility]     Social Connections     Social Connections and Isolation: 0   Housing Stability: Not on File (10/8/2022)    Received from [facility]     Housing Stability     Housin        REVIEW OF SYSTEMS:   GENERAL HEALTH: feels well otherwise  SKIN: denies any unusual skin lesions or rashes  RESPIRATORY: denies shortness of breath with exertion  CARDIOVASCULAR: denies chest pain on exertion  GI: denies abdominal pain and denies heartburn  NEURO: denies headaches  EXAM:   /66   Pulse 80   Temp 97.2 °F (36.2 °C) (Temporal)   Resp 16   Ht 6' 1\" (1.854 m)   Wt 203 lb (92.1 kg)   SpO2 99%   BMI 26.78 kg/m²   GENERAL: well developed, well nourished,in no apparent distress  LUNGS: clear to auscultation  CARDIO: RRR without murmur    ASSESSMENT AND PLAN:   Diagnoses and all orders for this visit:    Palpitations  Improved  Workup discussed  Continue metoprolol daily  Prescription given  -     metoprolol succinate ER 25 MG Oral Tablet 24 Hr; Take 1 tablet (25 mg total) by mouth daily.    High risk sexual behavior, unspecified type  Screening tests ordered-     T Pallidum Screening [place]; Future  -     HIV AG AB Combo (Consent Obtained prechecked); Future  -     Urine Chlamydia/GC Amplification; Future  -     Hepatitis B Surface Antibody; Future  -     Hepatitis B Surface Antigen; Future  -     HCV Antibody; Future        The patient indicates understanding of these issues and agrees to the plan.  Imaging & Consults:  None  Meds & Refills for this Visit:  Requested Prescriptions     Signed Prescriptions Disp Refills    metoprolol succinate ER 25 MG Oral Tablet 24 Hr 90 tablet 1     Sig: Take 1 tablet (25 mg total) by mouth daily.     Orders Placed This Encounter   Procedures    T Pallidum Screening [place]    HIV AG AB Combo (Consent Obtained prechecked)    Hepatitis B Surface Antibody    Hepatitis B Surface Antigen    HCV Antibody    Urine Chlamydia/GC Amplification              Ryley Chandra MD   [facility]  [address]  [facility] [address]    Electronically signed    This dictation was performed with a verbal recognition program (DRAGON) and it was checked for errors. It is possible that there are still dictated errors within this office note. If so, please bring any errors to my attention for an addendum. All efforts were made to ensure that this office note is accurate

## 2024-05-29 LAB
C TRACH DNA SPEC QL NAA+PROBE: NEGATIVE
N GONORRHOEA DNA SPEC QL NAA+PROBE: NEGATIVE

## 2024-05-30 ENCOUNTER — TELEPHONE (OUTPATIENT)
Dept: FAMILY MEDICINE CLINIC | Facility: CLINIC | Age: 29
End: 2024-05-30

## 2024-05-30 NOTE — TELEPHONE ENCOUNTER
Patient made a My Chart kailey as follows:    Appointment For: Re Jackson ([id number])   Visit Type: MYCHART EXAM (2964)      6/3/2024    12:15 PM  15 mins.  Ryley Chandra               [facility] [address]      Patient Comments:   Fitbit read a high heart rate, wanna double check       Please Triage if needed.

## 2024-06-03 ENCOUNTER — OFFICE VISIT (OUTPATIENT)
Dept: FAMILY MEDICINE CLINIC | Facility: CLINIC | Age: 29
End: 2024-06-03
Payer: COMMERCIAL

## 2024-06-03 VITALS
TEMPERATURE: 97 F | DIASTOLIC BLOOD PRESSURE: 70 MMHG | WEIGHT: 208 LBS | OXYGEN SATURATION: 99 % | HEART RATE: 80 BPM | SYSTOLIC BLOOD PRESSURE: 120 MMHG | RESPIRATION RATE: 18 BRPM | HEIGHT: 72 IN | BODY MASS INDEX: 28.17 KG/M2

## 2024-06-03 DIAGNOSIS — S13.9XXA ACUTE SPRAIN OF LIGAMENT OF NECK, INITIAL ENCOUNTER: ICD-10-CM

## 2024-06-03 DIAGNOSIS — J30.9 ALLERGIC RHINITIS, UNSPECIFIED SEASONALITY, UNSPECIFIED TRIGGER: Primary | ICD-10-CM

## 2024-06-03 PROCEDURE — 99213 OFFICE O/P EST LOW 20 MIN: CPT | Performed by: FAMILY MEDICINE

## 2024-06-03 PROCEDURE — 3008F BODY MASS INDEX DOCD: CPT | Performed by: FAMILY MEDICINE

## 2024-06-03 PROCEDURE — 3078F DIAST BP <80 MM HG: CPT | Performed by: FAMILY MEDICINE

## 2024-06-03 PROCEDURE — 3074F SYST BP LT 130 MM HG: CPT | Performed by: FAMILY MEDICINE

## 2024-06-03 NOTE — PROGRESS NOTES
/70   Pulse 80   Temp 97.2 °F (36.2 °C) (Temporal)   Resp 18   Ht 6' 1\" (1.854 m)   Wt 208 lb (94.3 kg)   SpO2 99%   BMI 27.44 kg/m²               Chief Complaint   Patient presents with    Neck Pain    Ear Pain     Right ear        HPI;    Re Jackson is a 28 year old adult.  Who is here today with history of discomfort in the right ear for last 1 week  Patient does have history of allergies, has been taking Zyrtec on and off    Also complains of pain in the right side of the neck.  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 Some dog breeds = Hives; but usually, just sneezing    Adhesive Tape RASH     Current Outpatient Medications   Medication Sig Dispense Refill    polyethylene glycol, PEG 3350, 17 g Oral Powd Pack Take 17 g by mouth daily.      metoprolol succinate ER 25 MG Oral Tablet 24 Hr Take 1 tablet (25 mg total) by mouth daily. 90 tablet 1    mupirocin 2 % External Ointment Apply 1 Application topically daily. (Patient taking differently: Apply 1 Application topically daily. As needed) 1 each 0    clotrimazole 1 % External Cream Apply 1 Application topically daily. (Patient taking differently: Apply 1 Application topically daily. As needed) 1 each 0    Acetaminophen (TYLENOL OR)       IBUPROFEN OR       Progesterone Micronized 100 MG Oral Cap Take 1 capsule (100 mg total) by mouth daily.      spironolactone 100 MG Oral Tab Take 1 tablet (100 mg total) by mouth daily.      estradiol 2 MG Oral Tab Take 2 tablets (4 mg total) by mouth daily.      Melatonin 10 MG Oral Cap Take 1 tablet by  mouth nightly as needed. 6mg        Past Medical History:    Acid reflux    Arrhythmia    elevated heart rate since covid    Esophageal reflux    High blood pressure    Spermatocele      Social History:  Social History     Socioeconomic History    Marital status:    Tobacco Use    Smoking status: Former    Smokeless tobacco: Never    Tobacco comments:     only for 3 months in 2017   Vaping Use    Vaping status: Never Used   Substance and Sexual Activity    Alcohol use: Not Currently     Alcohol/week: 6.0 standard drinks of alcohol     Types: 6 Standard drinks or equivalent per week     Comment: not currently    Drug use: Yes     Frequency: 3.0 times per week     Types: Cannabis   Other Topics Concern    Caffeine Concern Yes     Comment: 2 can of coke every other day.     Exercise Yes     Comment: Takes long walks 3 times weekly.    Social History Narrative    Single    Work- [employer]-     Grad student.      Social Determinants of Health     Financial Resource Strain: Not on File (10/8/2022)    [facility]     Financial Resource Strain     Financial Resource Strain: 0   Food Insecurity: Not on File (10/8/2022)    [facility] from [facility]     Food Insecurity     Food: 0   Transportation Needs: Not on File (10/8/2022)    [facility] from [facility]     Transportation Needs     Transportation: 0   Physical Activity: Not on File (10/8/2022)    [facility] from [facility]     Physical Activity     Physical Activity: 0   Stress: Not on File (10/8/2022)    [facility] from [facility]     Stress     Stress: 0   Social Connections: Not on File (10/8/2022)    [facility] from [facility]     Social Connections     Social Connections and Isolation: 0   Housing Stability: Not on File (10/8/2022)    [facility] from [facility]     Housing Stability     Housin        REVIEW OF SYSTEMS:   GENERAL HEALTH: feels well otherwise  SKIN: denies any unusual skin lesions or rashes  RESPIRATORY:  denies shortness of breath with exertion  CARDIOVASCULAR: denies chest pain on exertion  GI: denies abdominal pain and denies heartburn  NEURO: denies headaches  EXAM:   /70   Pulse 80   Temp 97.2 °F (36.2 °C) (Temporal)   Resp 18   Ht 6' 1\" (1.854 m)   Wt 208 lb (94.3 kg)   SpO2 99%   BMI 27.44 kg/m²   No distress, good color on room air.  Alert and cooperative.  HEENT:clear nasal discharge,hypertrophy of turbinates  bilat serous effusion   post nasal drip on post pharengeal wall  no sinus tenderness  Neck:no nodes, thyromegaly, JVD, or carotid bruits.  Mild tenderness on the right side of the back along the sternocleidomastoid  Lungs:Clear to auscultation and percussion, breath sounds are equal.  Heart:Rate and rhythm regular.  No murmurs, gallops, or rubs.  Skin:No Rashes.   ASSESSMENT AND PLAN:   Diagnoses and all orders for this visit:    Allergic rhinitis, unspecified seasonality, unspecified trigger  Restarted Zyrtec 1 tablet p.o. daily for at least 2 to 3 weeks  Acute sprain of ligament of neck, initial encounter  Tylenol and Motrin as needed  Discussed neck exercises    The patient indicates understanding of these issues and agrees to the plan.  Imaging & Consults:  None  Meds & Refills for this Visit:  Requested Prescriptions      No prescriptions requested or ordered in this encounter     No orders of the defined types were placed in this encounter.            Ryley Chandra MD   [facility]  [address], [address] [address]    Electronically signed    This dictation was performed with a verbal recognition program (DRAGON) and it was checked for errors. It is possible that there are still dictated errors within this office note. If so, please bring any errors to my attention for an addendum. All efforts were made to ensure that this office note is accurate

## 2024-06-15 ENCOUNTER — HOSPITAL ENCOUNTER (EMERGENCY)
Facility: HOSPITAL | Age: 29
Discharge: HOME | End: 2024-06-15
Attending: EMERGENCY MEDICINE

## 2024-06-15 ENCOUNTER — APPOINTMENT (OUTPATIENT)
Dept: GENERAL RADIOLOGY | Facility: HOSPITAL | Age: 29
End: 2024-06-15
Attending: EMERGENCY MEDICINE

## 2024-06-15 VITALS
RESPIRATION RATE: 14 BRPM | WEIGHT: 210 LBS | OXYGEN SATURATION: 100 % | DIASTOLIC BLOOD PRESSURE: 74 MMHG | SYSTOLIC BLOOD PRESSURE: 130 MMHG | BODY MASS INDEX: 28.44 KG/M2 | HEART RATE: 101 BPM | TEMPERATURE: 100 F | HEIGHT: 72 IN

## 2024-06-15 DIAGNOSIS — J03.80 ACUTE BACTERIAL TONSILLITIS: Primary | ICD-10-CM

## 2024-06-15 DIAGNOSIS — B96.89 ACUTE BACTERIAL TONSILLITIS: Primary | ICD-10-CM

## 2024-06-15 DIAGNOSIS — D72.829 LEUKOCYTOSIS, UNSPECIFIED TYPE: ICD-10-CM

## 2024-06-15 DIAGNOSIS — R00.0 SINUS TACHYCARDIA: ICD-10-CM

## 2024-06-15 LAB
ALBUMIN SERPL-MCNC: 3.8 G/DL (ref 3.4–5)
ALBUMIN/GLOB SERPL: 1 {RATIO} (ref 1–2)
ALP LIVER SERPL-CCNC: 60 U/L
ALT SERPL-CCNC: 15 U/L
ANION GAP SERPL CALC-SCNC: 7 MMOL/L (ref 0–18)
APTT PPP: 27.3 SECONDS (ref 23–36)
AST SERPL-CCNC: 12 U/L (ref 15–37)
ATRIAL RATE: 115 BPM
BASOPHILS # BLD AUTO: 0.05 X10(3) UL (ref 0–0.2)
BASOPHILS NFR BLD AUTO: 0.3 %
BILIRUB SERPL-MCNC: 0.9 MG/DL (ref 0.1–2)
BILIRUB UR QL STRIP.AUTO: NEGATIVE
BUN BLD-MCNC: 6 MG/DL (ref 9–23)
CALCIUM BLD-MCNC: 8.9 MG/DL (ref 8.5–10.1)
CHLORIDE SERPL-SCNC: 109 MMOL/L (ref 98–112)
CLARITY UR REFRACT.AUTO: CLEAR
CO2 SERPL-SCNC: 22 MMOL/L (ref 21–32)
COLOR UR AUTO: COLORLESS
CREAT BLD-MCNC: 0.74 MG/DL
D DIMER PPP FEU-MCNC: <0.27 UG/ML FEU (ref ?–0.5)
EGFRCR SERPLBLD CKD-EPI 2021: 112 ML/MIN/1.73M2 (ref 60–?)
EOSINOPHIL # BLD AUTO: 0.01 X10(3) UL (ref 0–0.7)
EOSINOPHIL NFR BLD AUTO: 0.1 %
ERYTHROCYTE [DISTWIDTH] IN BLOOD BY AUTOMATED COUNT: 12.5 %
GLOBULIN PLAS-MCNC: 3.9 G/DL (ref 2.8–4.4)
GLUCOSE BLD-MCNC: 105 MG/DL (ref 70–99)
GLUCOSE UR STRIP.AUTO-MCNC: NORMAL MG/DL
HCT VFR BLD AUTO: 36.2 %
HETEROPH AB SER QL: NEGATIVE
HGB BLD-MCNC: 13.1 G/DL
IMM GRANULOCYTES # BLD AUTO: 0.08 X10(3) UL (ref 0–1)
IMM GRANULOCYTES NFR BLD: 0.4 %
INR BLD: 1.06 (ref 0.8–1.2)
KETONES UR STRIP.AUTO-MCNC: NEGATIVE MG/DL
LACTATE SERPL-SCNC: 0.9 MMOL/L (ref 0.4–2)
LEUKOCYTE ESTERASE UR QL STRIP.AUTO: NEGATIVE
LYMPHOCYTES # BLD AUTO: 0.67 X10(3) UL (ref 1–4)
LYMPHOCYTES NFR BLD AUTO: 3.5 %
MCH RBC QN AUTO: 29.7 PG (ref 26–34)
MCHC RBC AUTO-ENTMCNC: 36.2 G/DL (ref 31–37)
MCV RBC AUTO: 82.1 FL
MONOCYTES # BLD AUTO: 1.03 X10(3) UL (ref 0.1–1)
MONOCYTES NFR BLD AUTO: 5.5 %
NEUTROPHILS # BLD AUTO: 17.04 X10 (3) UL (ref 1.5–7.7)
NEUTROPHILS # BLD AUTO: 17.04 X10(3) UL (ref 1.5–7.7)
NEUTROPHILS NFR BLD AUTO: 90.2 %
NITRITE UR QL STRIP.AUTO: NEGATIVE
OSMOLALITY SERPL CALC.SUM OF ELEC: 284 MOSM/KG (ref 275–295)
P AXIS: 69 DEGREES
P-R INTERVAL: 162 MS
PH UR STRIP.AUTO: 7 [PH] (ref 5–8)
PLATELET # BLD AUTO: 256 10(3)UL (ref 150–450)
POTASSIUM SERPL-SCNC: 3.6 MMOL/L (ref 3.5–5.1)
PROT SERPL-MCNC: 7.7 G/DL (ref 6.4–8.2)
PROT UR STRIP.AUTO-MCNC: NEGATIVE MG/DL
PROTHROMBIN TIME: 13.9 SECONDS (ref 11.6–14.8)
Q-T INTERVAL: 322 MS
QRS DURATION: 104 MS
QTC CALCULATION (BEZET): 445 MS
R AXIS: 81 DEGREES
RBC # BLD AUTO: 4.41 X10(6)UL
RBC UR QL AUTO: NEGATIVE
SODIUM SERPL-SCNC: 138 MMOL/L (ref 136–145)
SP GR UR STRIP.AUTO: <1.005 (ref 1–1.03)
T AXIS: 59 DEGREES
TROPONIN I SERPL HS-MCNC: <3 NG/L
UROBILINOGEN UR STRIP.AUTO-MCNC: NORMAL MG/DL
VENTRICULAR RATE: 115 BPM
WBC # BLD AUTO: 18.9 X10(3) UL (ref 4–11)

## 2024-06-15 PROCEDURE — 71045 X-RAY EXAM CHEST 1 VIEW: CPT | Performed by: EMERGENCY MEDICINE

## 2024-06-15 PROCEDURE — 87430 STREP A AG IA: CPT | Performed by: EMERGENCY MEDICINE

## 2024-06-15 PROCEDURE — 36415 COLL VENOUS BLD VENIPUNCTURE: CPT

## 2024-06-15 PROCEDURE — 86664 EPSTEIN-BARR NUCLEAR ANTIGEN: CPT | Performed by: EMERGENCY MEDICINE

## 2024-06-15 PROCEDURE — 85610 PROTHROMBIN TIME: CPT | Performed by: EMERGENCY MEDICINE

## 2024-06-15 PROCEDURE — 83605 ASSAY OF LACTIC ACID: CPT | Performed by: EMERGENCY MEDICINE

## 2024-06-15 PROCEDURE — 86665 EPSTEIN-BARR CAPSID VCA: CPT | Performed by: EMERGENCY MEDICINE

## 2024-06-15 PROCEDURE — 87040 BLOOD CULTURE FOR BACTERIA: CPT | Performed by: EMERGENCY MEDICINE

## 2024-06-15 PROCEDURE — 85025 COMPLETE CBC W/AUTO DIFF WBC: CPT | Performed by: EMERGENCY MEDICINE

## 2024-06-15 PROCEDURE — 85379 FIBRIN DEGRADATION QUANT: CPT | Performed by: EMERGENCY MEDICINE

## 2024-06-15 PROCEDURE — 85025 COMPLETE CBC W/AUTO DIFF WBC: CPT

## 2024-06-15 PROCEDURE — 93010 ELECTROCARDIOGRAM REPORT: CPT

## 2024-06-15 PROCEDURE — 86403 PARTICLE AGGLUT ANTBDY SCRN: CPT | Performed by: EMERGENCY MEDICINE

## 2024-06-15 PROCEDURE — 93005 ELECTROCARDIOGRAM TRACING: CPT

## 2024-06-15 PROCEDURE — 80053 COMPREHEN METABOLIC PANEL: CPT

## 2024-06-15 PROCEDURE — 99285 EMERGENCY DEPT VISIT HI MDM: CPT

## 2024-06-15 PROCEDURE — 96361 HYDRATE IV INFUSION ADD-ON: CPT

## 2024-06-15 PROCEDURE — 80053 COMPREHEN METABOLIC PANEL: CPT | Performed by: EMERGENCY MEDICINE

## 2024-06-15 PROCEDURE — 85379 FIBRIN DEGRADATION QUANT: CPT

## 2024-06-15 PROCEDURE — 81003 URINALYSIS AUTO W/O SCOPE: CPT | Performed by: EMERGENCY MEDICINE

## 2024-06-15 PROCEDURE — 84484 ASSAY OF TROPONIN QUANT: CPT | Performed by: EMERGENCY MEDICINE

## 2024-06-15 PROCEDURE — 85730 THROMBOPLASTIN TIME PARTIAL: CPT | Performed by: EMERGENCY MEDICINE

## 2024-06-15 PROCEDURE — 96360 HYDRATION IV INFUSION INIT: CPT

## 2024-06-15 RX ORDER — AMOXICILLIN 500 MG/1
500 TABLET, FILM COATED ORAL 2 TIMES DAILY
Qty: 14 TABLET | Refills: 0 | Status: SHIPPED | OUTPATIENT
Start: 2024-06-15 | End: 2024-06-22

## 2024-06-15 NOTE — ED INITIAL ASSESSMENT (HPI)
Pt c/o feeling not well since Monday and today woke with tachycardia 150-180. Pt c/o headache, fatigue and sob.

## 2024-06-15 NOTE — ED PROVIDER NOTES
Patient Seen in: [facility] Emergency Department      History     Chief Complaint   Patient presents with    Arrythmia/Palpitations     Stated Complaint: pt has been sick with allergies, heart rate elevated. pain in ears. pt woke up *    Subjective:   28-year-old transgender female, presents with ear pain, sore throat, URI symptoms and some dyspnea on exertion ongoing for few days.  Heart rate was elevated yesterday, high as high as 160s to 180s today at home, worse with ambulation.  History of anxiety has had multiple cardiac workups for chest pain associated anxiety in the past.  No current chest pain.  No short of breath at rest only with exertion.  No history of DVT or PE but does take estrogen and progesterone hormones, feminizing hormones.            Objective:   Past Medical History:    Acid reflux    Arrhythmia    elevated heart rate since covid    Esophageal reflux    High blood pressure    Spermatocele              Past Surgical History:   Procedure Laterality Date    Colonoscopy      [facility]  04/25/2024    Dr. Enciso: Normal    Other      lipoma removal, L upper shoulder    Other      wisdom teeth     Other      lipoma removed from neck in 2016                Social History     Socioeconomic History    Marital status:    Tobacco Use    Smoking status: Former    Smokeless tobacco: Never    Tobacco comments:     only for 3 months in 2017   Vaping Use    Vaping status: Never Used   Substance and Sexual Activity    Alcohol use: Not Currently     Alcohol/week: 6.0 standard drinks of alcohol     Types: 6 Standard drinks or equivalent per week     Comment: not currently    Drug use: Yes     Frequency: 3.0 times per week     Types: Cannabis   Other Topics Concern    Caffeine Concern Yes     Comment: 2 can of coke every other day.     Exercise Yes     Comment: Takes long walks 3 times weekly.    Social History Narrative    Single    Work- [employer]-     Grad student.      Social  Determinants of Health     Financial Resource Strain: Not on File (10/8/2022)    [facility] from [facility]     Financial Resource Strain     Financial Resource Strain: 0   Food Insecurity: Not on File (10/8/2022)    [facility] from [facility]     Food Insecurity     Food: 0   Transportation Needs: Not on File (10/8/2022)    [facility] from [facility]     Transportation Needs     Transportation: 0   Physical Activity: Not on File (10/8/2022)    [facility] from [facility]     Physical Activity     Physical Activity: 0   Stress: Not on File (10/8/2022)    [facility] from [facility]     Stress     Stress: 0   Social Connections: Not on File (10/8/2022)    [facility] from [facility]     Social Connections     Social Connections and Isolation: 0   Housing Stability: Not on File (10/8/2022)    [facility] from [facility]     Housing Stability     Housin              Review of Systems   Constitutional:  Positive for chills and fever.   HENT:  Positive for congestion, ear pain and sore throat. Negative for ear discharge, trouble swallowing and voice change.    Respiratory:  Positive for shortness of breath. Negative for cough.    Cardiovascular:  Negative for chest pain.   Gastrointestinal:  Negative for abdominal pain.   Genitourinary:  Negative for dysuria.   Neurological:  Negative for dizziness and headaches.       Positive for stated complaint: pt has been sick with allergies, heart rate elevated. pain in ears. pt woke up *  Other systems are as noted in HPI.  Constitutional and vital signs reviewed.      All other systems reviewed and negative except as noted above.    Physical Exam     ED Triage Vitals   BP 06/15/24 1304 123/71   Pulse 06/15/24 1304 (!) 131   Resp 06/15/24 1304 18   Temp 06/15/24 1304 99.6 °F (37.6 °C)   Temp src 06/15/24 1304 Oral   SpO2 06/15/24 1304 97 %   O2 Device 06/15/24 1430 None (Room air)       Current Vitals:   Vital Signs  BP: 116/73  Pulse: 105  Resp: 14  Temp: 99.6 °F (37.6  °C)  Temp src: Oral  MAP (mmHg): 87    Oxygen Therapy  SpO2: 100 %  O2 Device: None (Room air)            Physical Exam  Vitals and nursing note reviewed.   Constitutional:       General: She is not in acute distress.     Appearance: She is not toxic-appearing.   HENT:      Head: Normocephalic.      Right Ear: Tympanic membrane normal.      Left Ear: Tympanic membrane normal.      Nose: Congestion and rhinorrhea present.      Mouth/Throat:      Pharynx: Oropharyngeal exudate and posterior oropharyngeal erythema present.   Eyes:      Extraocular Movements: Extraocular movements intact.      Pupils: Pupils are equal, round, and reactive to light.   Cardiovascular:      Rate and Rhythm: Regular rhythm. Tachycardia present.      Heart sounds: Normal heart sounds.   Pulmonary:      Effort: Pulmonary effort is normal. No respiratory distress.      Breath sounds: Normal breath sounds. No stridor. No wheezing, rhonchi or rales.   Chest:      Chest wall: No tenderness.   Abdominal:      General: There is no distension.      Palpations: Abdomen is soft.      Tenderness: There is no abdominal tenderness. There is no guarding or rebound.   Musculoskeletal:         General: No tenderness. Normal range of motion.      Cervical back: Normal range of motion and neck supple. No rigidity.   Lymphadenopathy:      Cervical: Cervical adenopathy present.   Skin:     General: Skin is warm and dry.   Neurological:      General: No focal deficit present.      Mental Status: She is alert and oriented to person, place, and time.      Cranial Nerves: No cranial nerve deficit.   Psychiatric:         Mood and Affect: Mood normal.         Behavior: Behavior normal.               ED Course     Labs Reviewed   COMP METABOLIC PANEL (14) - Abnormal; Notable for the following components:       Result Value    Glucose 105 (*)     BUN 6 (*)     AST 12 (*)     All other components within normal limits   URINALYSIS WITH CULTURE REFLEX - Abnormal; Notable  for the following components:    Urine Color Colorless (*)     Spec Gravity <1.005 (*)     All other components within normal limits   CBC W/ DIFFERENTIAL - Abnormal; Notable for the following components:    WBC 18.9 (*)     Neutrophil Absolute Prelim 17.04 (*)     Neutrophil Absolute 17.04 (*)     Lymphocyte Absolute 0.67 (*)     Monocyte Absolute 1.03 (*)     All other components within normal limits   D-DIMER - Normal   PROTHROMBIN TIME (PT) - Normal   PTT, ACTIVATED - Normal   LACTIC ACID, PLASMA - Normal   MONO QUAL, RFX TO EBV-VCA ON NEG - Normal   TROPONIN I HIGH SENSITIVITY - Normal   RAPID STREP A SCREEN (LC) - Normal    Narrative:     A confirmatory culture is recommended if clinically indicated.   CBC WITH DIFFERENTIAL WITH PLATELET    Narrative:     The following orders were created for panel order CBC With Differential With Platelet.  Procedure                               Abnormality         Status                     ---------                               -----------         ------                     CBC W/ DIFFERENTIAL[[id number]]          Abnormal            Final result                 Please view results for these tests on the individual orders.   EBV, CHRONIC/ACTIVE INFECTION,IGG/IGM   RAINBOW DRAW GOLD   BLOOD CULTURE   BLOOD CULTURE     EKG    Rate, intervals and axes as noted on EKG Report.  Rate: 115  Rhythm: Sinus Rhythm  Reading: EKG sinus tachycardia 150 bpm.  Normal axis.  No ST elevations.  Intervals are stable.  Incomplete right bundle branch block.  When compared to September 2022, incomplete right bundle branch block was also present    Patient placed on cardiac monitor for telemetry monitoring secondary to sob, tachycardia. Interpretation at bedside by me is sinus tachycardia.                          MDM      XR CHEST AP PORTABLE  (CPT=71045)    Result Date: 6/15/2024  CONCLUSION:   Stable cardiac and mediastinal contours.  The lungs and pleural spaces are clear.  Regional osseous  structures are normal.   LOCATION:  [facility]      Dictated by (CST): Vivek Amaral MD on 6/15/2024 at 2:00 PM     Finalized by (CST): Vivek Amaral MD on 6/15/2024 at 2:00 PM        I independent interpreted x-ray of the chest without any obvious signs of acute infiltrate    Differential diagnosis includes, but is not limited to, tonsillitis, viral syndrome, bacterial infection, ear infection, sepsis, PE, ACS    Family at bedside helpful to provide information on the history presenting illness    External chart review demonstrates outpatient visit about a week and a half ago for right ear pain    28-year-old with tachycardia as well as ear pain and sore throat and URI symptoms.  Lungs are clear.  D-dimer negative.  Troponin negative.  Sinus tachycardia telemetry tracing.  Has enlarged tonsils bilaterally with erythema and exudates.  Uvula midline and no peritonsillar abscess.  Tonsillitis high suspicion.  I do suspect bacterial therefore we will place on antibiotics also will cover sinusitis with the sinus congestion.  Neuroexam is normal.  Heart rate is currently 98 but fluctuates between high 90s and low 100s.  She has had cardiac workups before.  EKG was sinus tach.  No signs of arrhythmia or heart block.  Would like to be discharged home.  No chest pain.  PE ruled out.  Troponin is negative.  Shared decision making utilized and return precaution atilio, discharged home after extensive discussion of risk benefits and alternatives    Patient was screened and evaluated during this visit.  As the treating physician attending to the patient, I determined within reasonable clinical confidence and prior to discharge, that an emergency medical condition was not or was no longer present.  There was no indication for further evaluation, treatment, or admission on an emergency basis.  Comprehensive verbal and written discharge and follow-up instructions were provided to help prevent relapse or worsening.  Patient was instructed to  follow-up with their primary care provider for further evaluation and treatment, return immediately to ER for worsening, concerning, new, or changing/persisting symptoms. I discussed the case with the patient and they had no questions, complaints, or concerns.  Patient was comfortable going home.     Per the discharge paperwork, patients are encouraged to and given instructions on how to sign up for MyChart, where they have access to their records, including any/all incidental findings.     This note was prepared using Dragon Medical voice recognition dictation software. As a result errors may occur. When identified these errors have been corrected. While every attempt is made to correct errors during dictation discrepancies may still exist    Note to patient: The 21st Century Cures Act makes medical notes like these available to patients in the interest of transparency. However, this is a medical document intended as peer to peer communication. It is written in medical language and may contain abbreviations or verbiage that are unfamiliar. It may appear blunt or direct. Medical documents are intended to carry relevant information, facts as evident, and the clinical opinion of the practitioner.                                   [facility]   part of [facility]      Sepsis Reassessment Note    /71   Pulse (!) 131   Temp 99.6 °F (37.6 °C) (Oral)   Resp 18   Ht 185.4 cm (6' 1\")   Wt 95.3 kg   SpO2 97%   BMI 27.71 kg/m²      I completed the sepsis reassessment at 1500    Cardiac:  Regularity: Regular  Rate: Tachycardic  Heart Sounds: S1,S2    Lungs:   Right: Clear  Left: Clear    Peripheral Pulses:  Radial: Right 1+ or Left 1+      Capillary Refill:  <3 Secs    Skin:  Temp/Moisture: Warm and Dry  Color: Normal      Melecio Kaur,   6/15/2024  1:41 PM            Medical Decision Making      Disposition and Plan     Clinical Impression:  1. Acute bacterial tonsillitis    2. Sinus tachycardia     3. Leukocytosis, unspecified type         Disposition:  Discharge  6/15/2024  3:50 pm    Follow-up:  Karmen Keller APRN  [address]  [facility] [address]  [phone]    Follow up            Medications Prescribed:  Current Discharge Medication List        START taking these medications    Details   amoxicillin 500 MG Oral Tab Take 1 tablet (500 mg total) by mouth 2 (two) times daily for 7 days.  Qty: 14 tablet, Refills: 0

## 2024-06-17 ENCOUNTER — TELEPHONE (OUTPATIENT)
Dept: FAMILY MEDICINE CLINIC | Facility: CLINIC | Age: 29
End: 2024-06-17

## 2024-06-17 LAB
EBV NA IGG SER QL IA: POSITIVE
EBV VCA IGG SER QL IA: POSITIVE
EBV VCA IGM SER QL IA: NEGATIVE

## 2024-06-17 NOTE — TELEPHONE ENCOUNTER
Pt was seen in ED on 6/15 for this and instructed to follow-up.  RN to pt call for triage of symptoms, unable to leave VM on unidentified mailbox.

## 2024-06-17 NOTE — TELEPHONE ENCOUNTER
Patient scheduled the below appointment through My Chart.  Does it need to be triaged?    Appointment For: Re Jackson ([id number])   Visit Type: MYCHART EXAM (2964)      6/20/2024   11:00 AM  15 mins.  Ryley Chandra               [facility] [address]      Patient Comments:   Tonsillitis and high heart rate

## 2024-06-20 ENCOUNTER — OFFICE VISIT (OUTPATIENT)
Dept: FAMILY MEDICINE CLINIC | Facility: CLINIC | Age: 29
End: 2024-06-20

## 2024-06-20 VITALS
SYSTOLIC BLOOD PRESSURE: 130 MMHG | OXYGEN SATURATION: 98 % | BODY MASS INDEX: 29.39 KG/M2 | HEART RATE: 86 BPM | WEIGHT: 217 LBS | RESPIRATION RATE: 18 BRPM | HEIGHT: 72 IN | DIASTOLIC BLOOD PRESSURE: 68 MMHG | TEMPERATURE: 97 F

## 2024-06-20 DIAGNOSIS — J03.90 ACUTE TONSILLITIS, UNSPECIFIED ETIOLOGY: ICD-10-CM

## 2024-06-20 DIAGNOSIS — R00.2 PALPITATIONS: Primary | ICD-10-CM

## 2024-06-20 PROCEDURE — 3008F BODY MASS INDEX DOCD: CPT | Performed by: FAMILY MEDICINE

## 2024-06-20 PROCEDURE — 3078F DIAST BP <80 MM HG: CPT | Performed by: FAMILY MEDICINE

## 2024-06-20 PROCEDURE — 99214 OFFICE O/P EST MOD 30 MIN: CPT | Performed by: FAMILY MEDICINE

## 2024-06-20 PROCEDURE — 3075F SYST BP GE 130 - 139MM HG: CPT | Performed by: FAMILY MEDICINE

## 2024-06-20 NOTE — PROGRESS NOTES
/68   Pulse 86   Temp 97.3 °F (36.3 °C) (Temporal)   Resp 18   Ht 6' 1\" (1.854 m)   Wt 217 lb (98.4 kg)   SpO2 98%   BMI 28.63 kg/m²               Chief Complaint   Patient presents with    ER F/U        HPI;    Re Jackson is a 28 year old adult.  Who is here today for follow-up from the emergency room, patient was diagnosed with acute bacterial tonsillitis  Her heart rate was found to be high in the ER at 131  Patient is taking metoprolol daily  Workup was negative for the heart arrhythmias  EKG revealed incomplete right bundle branch block and tachycardia D-dimer serum chemistry troponins were found to be normal.  For her sore throat strep test as well as Hansel-Francis antibody was negative urinalysis was normal  She was prescribed amoxicillin twice a day and was advised to follow-up with us  Doing well, denies any complaints, sore throat is almost gone  Denies any fever nausea or vomiting  Heart rate today is 86  No palpitations      Wt Readings from Last 3 Encounters:   06/20/24 217 lb (98.4 kg)   06/15/24 210 lb (95.3 kg)   06/03/24 208 lb (94.3 kg)     BP Readings from Last 3 Encounters:   06/20/24 130/68   06/15/24 130/74   06/03/24 120/70         ALLERGIES:  Allergies   Allergen Reactions    Dander HIVES     Dogs & cats. Some dog breeds = Hives; but usually, just sneezing    Adhesive Tape RASH     Current Outpatient Medications   Medication Sig Dispense Refill    amoxicillin 500 MG Oral Tab Take 1 tablet (500 mg total) by mouth 2 (two) times daily for 7 days. 14 tablet 0    polyethylene glycol, PEG 3350, 17 g Oral Powd Pack Take 17 g by mouth daily.      metoprolol succinate ER 25 MG Oral Tablet 24 Hr Take 1 tablet (25 mg total) by mouth daily. 90 tablet 1    mupirocin 2 % External Ointment Apply 1 Application topically daily. (Patient taking differently: Apply 1 Application topically daily. As needed) 1 each 0    clotrimazole 1 % External Cream Apply 1 Application topically daily.  (Patient taking differently: Apply 1 Application topically daily. As needed) 1 each 0    Acetaminophen (TYLENOL OR)       IBUPROFEN OR       Progesterone Micronized 100 MG Oral Cap Take 1 capsule (100 mg total) by mouth daily.      spironolactone 100 MG Oral Tab Take 1 tablet (100 mg total) by mouth daily.      estradiol 2 MG Oral Tab Take 2 tablets (4 mg total) by mouth daily.      Melatonin 10 MG Oral Cap Take 1 tablet by mouth nightly as needed. 6mg        Past Medical History:    Acid reflux    Arrhythmia    elevated heart rate since covid    Esophageal reflux    High blood pressure    Spermatocele      Social History:  Social History     Socioeconomic History    Marital status:    Tobacco Use    Smoking status: Former    Smokeless tobacco: Never    Tobacco comments:     only for 3 months in 2017   Vaping Use    Vaping status: Never Used   Substance and Sexual Activity    Alcohol use: Not Currently     Alcohol/week: 6.0 standard drinks of alcohol     Types: 6 Standard drinks or equivalent per week     Comment: not currently    Drug use: Yes     Frequency: 3.0 times per week     Types: Cannabis   Other Topics Concern    Caffeine Concern Yes     Comment: 2 can of coke every other day.     Exercise Yes     Comment: Takes long walks 3 times weekly.    Social History Narrative    Single    Work- [employer]-     Grad student.      Social Determinants of Health     Financial Resource Strain: Not on File (10/8/2022)    [facility]     Financial Resource Strain     Financial Resource Strain: 0   Food Insecurity: Not on File (10/8/2022)    [facility] from [facility]     Food Insecurity     Food: 0   Transportation Needs: Not on File (10/8/2022)    [facility]     Transportation Needs     Transportation: 0   Physical Activity: Not on File (10/8/2022)    [facility]     Physical Activity     Physical Activity: 0   Stress: Not on File (10/8/2022)     [facility]     Stress     Stress: 0   Social Connections: Not on File (10/8/2022)    [facility] from [facility]     Social Connections     Social Connections and Isolation: 0   Housing Stability: Not on File (10/8/2022)    [facility] from [facility]     Housing Stability     Housin        REVIEW OF SYSTEMS:   GENERAL HEALTH: feels well otherwise  SKIN: denies any unusual skin lesions or rashes  RESPIRATORY: denies shortness of breath with exertion  CARDIOVASCULAR: denies chest pain on exertion  GI: denies abdominal pain and denies heartburn  NEURO: denies headaches  EXAM:   /68   Pulse 86   Temp 97.3 °F (36.3 °C) (Temporal)   Resp 18   Ht 6' 1\" (1.854 m)   Wt 217 lb (98.4 kg)   SpO2 98%   BMI 28.63 kg/m²   GENERAL: well developed, well nourished,in no apparent distress  SKIN: no rashes,no suspicious lesions  HEENT: atraumatic, normocephalic,ears and throat are clear  NECK: supple,no adenopathy,no bruits  LUNGS: clear to auscultation  CARDIO: RRR without murmur  GI: good BS's,no masses, HSM or tenderness  EXTREMITIES: no cyanosis, clubbing or edema    ASSESSMENT AND PLAN:   Diagnoses and all orders for this visit:    Palpitations  I had a long discussion with the patient regarding her tachycardia  Records from the emergency room reviewed including labs, EKG  Given the recurrence we will get input from cardiology-  Patient is advised to continue metoprolol  Will get cardiology on consult  Referral sent to Dr. Hemphill   [facility] CARDIOLOGY EXTERNAL    Acute tonsillitis, unspecified etiology  Gargling  To finish the course of amoxicillin      The patient indicates understanding of these issues and agrees to the plan.  Imaging & Consults:  [facility] CARDIOLOGY EXTERNAL  Meds & Refills for this Visit:  Requested Prescriptions      No prescriptions requested or ordered in this encounter     No orders of the defined types were placed in this encounter.            Ryley  Corazon REINA   [facility]  [address]  [facility] [address]    Electronically signed    This dictation was performed with a verbal recognition program (DRAGON) and it was checked for errors. It is possible that there are still dictated errors within this office note. If so, please bring any errors to my attention for an addendum. All efforts were made to ensure that this office note is accurate

## 2024-06-23 ENCOUNTER — V-VISIT (OUTPATIENT)
Dept: URGENT CARE | Age: 29
End: 2024-06-23

## 2024-06-23 VITALS
SYSTOLIC BLOOD PRESSURE: 120 MMHG | BODY MASS INDEX: 28.44 KG/M2 | DIASTOLIC BLOOD PRESSURE: 74 MMHG | WEIGHT: 210 LBS | OXYGEN SATURATION: 98 % | HEIGHT: 72 IN | TEMPERATURE: 97.1 F | RESPIRATION RATE: 16 BRPM | HEART RATE: 76 BPM

## 2024-06-23 DIAGNOSIS — J02.9 SORE THROAT: Primary | ICD-10-CM

## 2024-06-23 LAB
INTERNAL PROCEDURAL CONTROLS ACCEPTABLE: YES
S PYO AG THROAT QL IA.RAPID: NEGATIVE
TEST LOT EXPIRATION DATE: NORMAL
TEST LOT NUMBER: NORMAL

## 2024-06-23 PROCEDURE — 87081 CULTURE SCREEN ONLY: CPT | Performed by: CLINICAL MEDICAL LABORATORY

## 2024-06-23 PROCEDURE — 87880 STREP A ASSAY W/OPTIC: CPT | Performed by: NURSE PRACTITIONER

## 2024-06-23 PROCEDURE — 99203 OFFICE O/P NEW LOW 30 MIN: CPT | Performed by: NURSE PRACTITIONER

## 2024-06-24 ENCOUNTER — HOSPITAL ENCOUNTER (OUTPATIENT)
Age: 29
Discharge: HOME | End: 2024-06-24

## 2024-06-24 VITALS
OXYGEN SATURATION: 96 % | WEIGHT: 210 LBS | HEIGHT: 72 IN | HEART RATE: 118 BPM | DIASTOLIC BLOOD PRESSURE: 73 MMHG | RESPIRATION RATE: 16 BRPM | TEMPERATURE: 99 F | BODY MASS INDEX: 28.44 KG/M2 | SYSTOLIC BLOOD PRESSURE: 139 MMHG

## 2024-06-24 DIAGNOSIS — J03.90 ACUTE TONSILLITIS, UNSPECIFIED ETIOLOGY: Primary | ICD-10-CM

## 2024-06-24 LAB
POCT MONO: NEGATIVE
S PYO AG THROAT QL IA.RAPID: NEGATIVE
SARS-COV-2 RNA RESP QL NAA+PROBE: NOT DETECTED

## 2024-06-24 PROCEDURE — 36415 COLL VENOUS BLD VENIPUNCTURE: CPT

## 2024-06-24 PROCEDURE — 99213 OFFICE O/P EST LOW 20 MIN: CPT

## 2024-06-24 PROCEDURE — 86308 HETEROPHILE ANTIBODY SCREEN: CPT | Performed by: PHYSICIAN ASSISTANT

## 2024-06-24 PROCEDURE — 87651 STREP A DNA AMP PROBE: CPT | Performed by: PHYSICIAN ASSISTANT

## 2024-06-24 RX ORDER — IBUPROFEN 600 MG/1
600 TABLET ORAL ONCE
Status: DISCONTINUED | OUTPATIENT
Start: 2024-06-24 | End: 2024-06-24

## 2024-06-24 RX ORDER — DEXAMETHASONE 4 MG/1
4 TABLET ORAL ONCE
Status: DISCONTINUED | OUTPATIENT
Start: 2024-06-24 | End: 2024-06-24

## 2024-06-24 RX ORDER — PENICILLIN V POTASSIUM 500 MG/1
500 TABLET ORAL 2 TIMES DAILY
Qty: 20 TABLET | Refills: 0 | Status: SHIPPED | OUTPATIENT
Start: 2024-06-24 | End: 2024-07-04

## 2024-06-24 NOTE — ED INITIAL ASSESSMENT (HPI)
Pt states they recently had tonsillitis and did a course of amoxicillin, 2 days later complaining of pain to the throat again. Was seen yesterday at another walk in clinic where they did a throat culture, which would be ready in 72 hours. Pt states today the throat felt worse and began to have chills.

## 2024-06-25 ENCOUNTER — OFFICE VISIT (OUTPATIENT)
Dept: OTOLARYNGOLOGY | Facility: CLINIC | Age: 29
End: 2024-06-25

## 2024-06-25 ENCOUNTER — TELEPHONE (OUTPATIENT)
Dept: URGENT CARE | Age: 29
End: 2024-06-25

## 2024-06-25 DIAGNOSIS — J03.90 TONSILLITIS: Primary | ICD-10-CM

## 2024-06-25 DIAGNOSIS — J02.9 PHARYNGITIS, UNSPECIFIED ETIOLOGY: ICD-10-CM

## 2024-06-25 LAB — S PYO SPEC QL CULT: NORMAL

## 2024-06-25 PROCEDURE — 99214 OFFICE O/P EST MOD 30 MIN: CPT | Performed by: STUDENT IN AN ORGANIZED HEALTH CARE EDUCATION/TRAINING PROGRAM

## 2024-06-25 PROCEDURE — G2211 COMPLEX E/M VISIT ADD ON: HCPCS | Performed by: STUDENT IN AN ORGANIZED HEALTH CARE EDUCATION/TRAINING PROGRAM

## 2024-06-25 RX ORDER — LIDOCAINE HYDROCHLORIDE 20 MG/ML
10 SOLUTION OROPHARYNGEAL
Qty: 200 ML | Refills: 0 | Status: SHIPPED | OUTPATIENT
Start: 2024-06-25 | End: 2024-06-30

## 2024-06-25 RX ORDER — PREDNISONE 20 MG/1
TABLET ORAL
Qty: 15 TABLET | Refills: 0 | Status: SHIPPED | OUTPATIENT
Start: 2024-06-25 | End: 2024-07-05

## 2024-06-25 NOTE — PROGRESS NOTES
Re Jackson is a 28 year old adult.   Chief Complaint   Patient presents with    Tonsillitis     Has been for about 1.5 weeks now, having some pain, was seen in ER, strep negative. Having trouble swollowing.     HPI:   28-year-old presents with a sore throat and tonsillitis for about a week and a half was on a course of Augmentin which did help the symptoms although when it was complete the symptoms have returned.  Tested negative for strep and mono in the ER.  Has been given penicillin v 500mg BID    Current Outpatient Medications   Medication Sig Dispense Refill    penicillin v potassium 500 MG Oral Tab Take 1 tablet (500 mg total) by mouth in the morning and 1 tablet (500 mg total) before bedtime. Do all this for 10 days. 20 tablet 0    polyethylene glycol, PEG 3350, 17 g Oral Powd Pack Take 17 g by mouth daily.      metoprolol succinate ER 25 MG Oral Tablet 24 Hr Take 1 tablet (25 mg total) by mouth daily. 90 tablet 1    mupirocin 2 % External Ointment Apply 1 Application topically daily. (Patient taking differently: Apply 1 Application topically daily. As needed) 1 each 0    clotrimazole 1 % External Cream Apply 1 Application topically daily. (Patient taking differently: Apply 1 Application topically daily. As needed) 1 each 0    Acetaminophen (TYLENOL OR)       IBUPROFEN OR       Progesterone Micronized 100 MG Oral Cap Take 1 capsule (100 mg total) by mouth daily.      spironolactone 100 MG Oral Tab Take 1 tablet (100 mg total) by mouth daily.      estradiol 2 MG Oral Tab Take 2 tablets (4 mg total) by mouth daily.      Melatonin 10 MG Oral Cap Take 1 tablet by mouth nightly as needed. 6mg        Past Medical History:    Acid reflux    Arrhythmia    elevated heart rate since covid    Esophageal reflux    High blood pressure    Spermatocele      Social History:  Social History     Socioeconomic History    Marital status:    Tobacco Use    Smoking status: Former    Smokeless tobacco: Never     Tobacco comments:     only for 3 months in 2017   Vaping Use    Vaping status: Never Used   Substance and Sexual Activity    Alcohol use: Not Currently     Alcohol/week: 6.0 standard drinks of alcohol     Types: 6 Standard drinks or equivalent per week     Comment: not currently    Drug use: Yes     Frequency: 3.0 times per week     Types: Cannabis   Other Topics Concern    Caffeine Concern Yes     Comment: 2 can of coke every other day.     Exercise Yes     Comment: Takes long walks 3 times weekly.    Social History Narrative    Single    Work- [employer]-     Grad student.      Social Determinants of Health     Financial Resource Strain: Not on File (10/8/2022)    Received from [facility]     Financial Resource Strain     Financial Resource Strain: 0   Food Insecurity: Not on File (10/8/2022)    Received from [facility]     Food Insecurity     Food: 0   Transportation Needs: Not on File (10/8/2022)    [facility]     Transportation Needs     Transportation: 0   Physical Activity: Not on File (10/8/2022)    [facility]     Physical Activity     Physical Activity: 0   Stress: Not on File (10/8/2022)    [facility]     Stress     Stress: 0   Social Connections: Not on File (10/8/2022)    [facility]     Social Connections     Social Connections and Isolation: 0   Housing Stability: Not on File (10/8/2022)    Received from [facility]     Housing Stability     Housin      Past Surgical History:   Procedure Laterality Date    Colonoscopy      Egd  2024    Dr. Enciso: Normal    Other      lipoma removal, L upper shoulder    Other      wisdom teeth     Other      lipoma removed from neck in 2016         EXAM:   There were no vitals taken for this visit.    System Details   Skin Inspection - Normal.   Constitutional Overall appearance - Normal.   Head/Face Symmetric, TMJ tenderness not present    Eyes EOMI, PERRL   Right  ear:  Canal clear, TM intact, no STEVEN   Left ear:  Canal clear, TM intact, no STEVEN   Nose: Septum midline, inferior turbinates not enlarged, nasal valves without collapse    Oral cavity/Oropharynx: Right tonsillar inflammation with exudate as well as cobblestoning of the posterior pharyngeal wall.  No obvious bulging of the soft palate to indicate an abscess   Neck: Soft without LAD, thyroid not enlarged  Voice clear/ no stridor   Other:      SCOPES AND PROCEDURES:         AUDIOGRAM AND IMAGING:         IMPRESSION:   1. Tonsillitis    2. Pharyngitis, unspecified etiology     Recommendations:  -28-year-old with persistent right-sided tonsillitis despite a course of Augmentin  -Cultures taken today for chlamydia and gonorrhea as well as a general throat culture for aerobic and anaerobic with sensitivities  -Will follow-up on culture results to see if antibiotics need to be changed  -Will also prescribe oral steroids discussed with the patient to contact their prescriber of the beta-blocker if concerns about interactions  -May obtain a CT scan of the neck if still not improving or worsening    The patient indicates understanding of these issues and agrees to the plan.  Longitudinal care will be provided with follow-up on cultures and possible treatment alterations    Aroldo Abdul MD  6/25/2024  2:56 PM

## 2024-06-25 NOTE — ED PROVIDER NOTES
Patient Seen in: [facility]      History     Chief Complaint   Patient presents with    Sore Throat     Stated Complaint: sore throat, chills, fever    Subjective:   HPI    28-year-old patient recently had tonsillitis and did a course of amoxicillin stated that 2 days later pain to the throat came back again this was last week.  Was seen yesterday at the walk-in clinic where they did a throat culture which should be ready in 72 hours.  Patient reports that the throat has gotten worse he started to have chills.  Patient denies chest pain, shortness of breath, cough, abdominal pain, nausea, vomiting or diarrhea.  Afebrile    Objective:   No pertinent past medical history.            No pertinent past surgical history.            The patient's medication list, past medical history and social history elements  as listed in today's nurse's notes are reviewed and agree.   The patient's family history is reviewed and is noncontributory to the presenting problem, except as indicated as above.     No pertinent social history.            Review of Systems    Positive for stated complaint: sore throat, chills, fever  Other systems are as noted in HPI.  Constitutional and vital signs reviewed.      All other systems reviewed and negative except as noted above.    Physical Exam     ED Triage Vitals [06/24/24 1837]   /73   Pulse 118   Resp 16   Temp 98.6 °F (37 °C)   Temp src Oral   SpO2 96 %   O2 Device None (Room air)       Current Vitals:   Vital Signs  BP: 139/73  Pulse: 118  Resp: 16  Temp: 98.6 °F (37 °C)  Temp src: Oral    Oxygen Therapy  SpO2: 96 %  O2 Device: None (Room air)            Physical Exam  Vitals and nursing note reviewed.   Constitutional:       Appearance: She is well-developed.   HENT:      Head: Normocephalic.      Jaw: There is normal jaw occlusion.      Right Ear: External ear normal.      Left Ear: External ear normal.      Nose: Nose normal.      Mouth/Throat:      Lips: Pink.       Mouth: Mucous membranes are moist.      Pharynx: Pharyngeal swelling present.      Tonsils: Tonsillar exudate present. 3+ on the right. 1+ on the left.      Comments: Uvula midline:no trismus or drooling: No peritonsillar abscess however R>L 3:1: airway is patent  Eyes:      Conjunctiva/sclera: Conjunctivae normal.      Pupils: Pupils are equal, round, and reactive to light.   Cardiovascular:      Rate and Rhythm: Normal rate and regular rhythm.      Heart sounds: Normal heart sounds.   Pulmonary:      Effort: Pulmonary effort is normal.      Breath sounds: Normal breath sounds.   Musculoskeletal:      Cervical back: Normal range of motion and neck supple.   Skin:     General: Skin is warm.      Capillary Refill: Capillary refill takes less than 2 seconds.   Neurological:      General: No focal deficit present.      Mental Status: She is alert and oriented to person, place, and time.   Psychiatric:         Mood and Affect: Mood normal.         Behavior: Behavior normal.         Thought Content: Thought content normal.         Judgment: Judgment normal.             ED Course     Labs Reviewed   POCT MONO TEST - Normal   RAPID SARS-COV-2 BY PCR - Normal   RAPID STREP A - Normal                      MDM   Clinical Impression: R sided tonsillitis  Course of Treatment:   Gargle with warm saline rinses.  Discard toothbrush.  Take the full course of antibiotics as prescribed in tandem with a probiotic daily.  Due to the recurrent nature recommend follow-up with ENT for further evaluation and treatment.        The patient is encouraged to return if any concerning symptoms arise. Additional verbal discharge instructions are given and discussed. Discharge medications are discussed. The patient is in good condition throughout the visit today and remains so upon discharge. I discuss the plan of care with the patient, who expresses understanding. All questions and concerns are addressed to the patient's satisfaction prior  to discharge today.  Previous conversations with PCP and charts were reviewed.                                       Medical Decision Making      Disposition and Plan     Clinical Impression:  1. Acute tonsillitis, unspecified etiology         Disposition:  Discharge  6/24/2024  7:33 pm    Follow-up:  Karmen Keller APRN  [address] [facility] [address]  [phone]          Aroldo Abdul MD  [address] [facility] [address]  [facility] [id number] [phone]                Medications Prescribed:  Current Discharge Medication List        START taking these medications    Details   penicillin v potassium 500 MG Oral Tab Take 1 tablet (500 mg total) by mouth in the morning and 1 tablet (500 mg total) before bedtime. Do all this for 10 days.  Qty: 20 tablet, Refills: 0

## 2024-06-25 NOTE — DISCHARGE INSTRUCTIONS
Please return to the ER/clinic if symptoms worsen. Follow-up with your PCP in 24-48 hours as needed.    Gargle with warm saline rinses.  Discard toothbrush.  Take the full course of antibiotics as prescribed in tandem with a probiotic daily.  Due to the recurrent nature recommend follow-up with ENT for further evaluation and treatment.

## 2024-06-27 LAB
C. TRACHOMATIS, NAA, PHARYN: NEGATIVE
N. GONORRHOEAE, NAA, PHARYN: NEGATIVE

## 2024-06-29 ENCOUNTER — MOBILE ENCOUNTER (OUTPATIENT)
Dept: OTOLARYNGOLOGY | Facility: CLINIC | Age: 29
End: 2024-06-29

## 2024-06-30 NOTE — PROGRESS NOTES
Spoke to patient via telephone regarding culture results.  Patient is feeling significantly better on current penicillin course.  This is appropriate coverage for strep C infection.  As patient is feeling significantly better we will not add additional antibiotics at this time.  Could consider the addition of clindamycin or third-generation cephalosporin if patient's infection does not fully resolve.  I recommend that patient call the office on Monday to check in with Dr. Abdul who is the treating physician.

## 2024-07-01 ENCOUNTER — TELEPHONE (OUTPATIENT)
Dept: OTOLARYNGOLOGY | Facility: CLINIC | Age: 29
End: 2024-07-01

## 2024-07-01 NOTE — TELEPHONE ENCOUNTER
Patient had two tonsil cultures done on 6/28 and would like to discuss the results with Dr. Abdul. Please call.

## 2024-07-02 ENCOUNTER — OFFICE VISIT (OUTPATIENT)
Dept: OTOLARYNGOLOGY | Facility: CLINIC | Age: 29
End: 2024-07-02
Payer: COMMERCIAL

## 2024-07-02 DIAGNOSIS — J03.90 TONSILLITIS: Primary | ICD-10-CM

## 2024-07-02 PROCEDURE — G2211 COMPLEX E/M VISIT ADD ON: HCPCS | Performed by: STUDENT IN AN ORGANIZED HEALTH CARE EDUCATION/TRAINING PROGRAM

## 2024-07-02 PROCEDURE — 99213 OFFICE O/P EST LOW 20 MIN: CPT | Performed by: STUDENT IN AN ORGANIZED HEALTH CARE EDUCATION/TRAINING PROGRAM

## 2024-07-02 RX ORDER — CLINDAMYCIN HYDROCHLORIDE 300 MG/1
300 CAPSULE ORAL 3 TIMES DAILY
Qty: 30 CAPSULE | Refills: 0 | Status: SHIPPED | OUTPATIENT
Start: 2024-07-02 | End: 2024-07-12

## 2024-07-02 NOTE — PROGRESS NOTES
Re Jackson is a 28 year old adult.   Chief Complaint   Patient presents with    Follow - Up     Labs results, pain when swallowing     HPI:   28-year-old presents in follow-up regarding tonsillitis.  The patient did feel that their symptoms were mostly resolved until this morning woke up with some recurrent slight pain on the right side    Current Outpatient Medications   Medication Sig Dispense Refill    predniSONE 20 MG Oral Tab Take 2 tablets (40 mg total) by mouth daily for 5 days, THEN 1 tablet (20 mg total) daily for 5 days. 15 tablet 0    penicillin v potassium 500 MG Oral Tab Take 1 tablet (500 mg total) by mouth in the morning and 1 tablet (500 mg total) before bedtime. Do all this for 10 days. 20 tablet 0    polyethylene glycol, PEG 3350, 17 g Oral Powd Pack Take 17 g by mouth daily.      metoprolol succinate ER 25 MG Oral Tablet 24 Hr Take 1 tablet (25 mg total) by mouth daily. 90 tablet 1    mupirocin 2 % External Ointment Apply 1 Application topically daily. (Patient taking differently: Apply 1 Application topically daily. As needed) 1 each 0    clotrimazole 1 % External Cream Apply 1 Application topically daily. (Patient taking differently: Apply 1 Application topically daily. As needed) 1 each 0    Acetaminophen (TYLENOL OR)       IBUPROFEN OR       Progesterone Micronized 100 MG Oral Cap Take 1 capsule (100 mg total) by mouth daily.      spironolactone 100 MG Oral Tab Take 1 tablet (100 mg total) by mouth daily.      estradiol 2 MG Oral Tab Take 2 tablets (4 mg total) by mouth daily.      Melatonin 10 MG Oral Cap Take 1 tablet by mouth nightly as needed. 6mg        Past Medical History:    Acid reflux    Arrhythmia    elevated heart rate since covid    Esophageal reflux    High blood pressure    Spermatocele      Social History:  Social History     Socioeconomic History    Marital status:    Tobacco Use    Smoking status: Former    Smokeless tobacco: Never    Tobacco comments:     only  for 3 months in 2017   Vaping Use    Vaping status: Never Used   Substance and Sexual Activity    Alcohol use: Not Currently     Alcohol/week: 6.0 standard drinks of alcohol     Types: 6 Standard drinks or equivalent per week     Comment: not currently    Drug use: Yes     Frequency: 3.0 times per week     Types: Cannabis   Other Topics Concern    Caffeine Concern Yes     Comment: 2 can of coke every other day.     Exercise Yes     Comment: Takes long walks 3 times weekly.    Social History Narrative    Single    Work- [employer]-     Grad student.      Social Determinants of Health     Financial Resource Strain: Not on File (10/8/2022)    [facility]     Financial Resource Strain     Financial Resource Strain: 0   Food Insecurity: Not on File (10/8/2022)    [facility] from [facility]     Food Insecurity     Food: 0   Transportation Needs: Not on File (10/8/2022)    [facility]     Transportation Needs     Transportation: 0   Physical Activity: Not on File (10/8/2022)    [facility]     Physical Activity     Physical Activity: 0   Stress: Not on File (10/8/2022)    [facility]     Stress     Stress: 0   Social Connections: Not on File (10/8/2022)    [facility]     Social Connections     Social Connections and Isolation: 0   Housing Stability: Not on File (10/8/2022)    [facility] from [facility]     Housing Stability     Housin      Past Surgical History:   Procedure Laterality Date    Colonoscopy      Egd  2024    Dr. Enciso: Normal    Other      lipoma removal, L upper shoulder    Other      wisdom teeth     Other      lipoma removed from neck in 2016         EXAM:   There were no vitals taken for this visit.    System Details   Skin Inspection - Normal.   Constitutional Overall appearance - Normal.   Head/Face Symmetric, TMJ tenderness not present    Eyes EOMI, PERRL   Right ear:  Canal clear, TM intact,  no STEVEN   Left ear:  Canal clear, TM intact, no STEVEN   Nose: Septum midline, inferior turbinates not enlarged, nasal valves without collapse    Oral cavity/Oropharynx: No lesions or masses on inspection or palpation, tonsils much improved about 90% compared to last visit although still with some slight erythema and inflammation of the posterior tonsillar pillar on the right side   Neck: Soft without LAD, thyroid not enlarged  Voice clear/ no stridor   Other:      SCOPES AND PROCEDURES:         AUDIOGRAM AND IMAGING:         IMPRESSION:   1. Tonsillitis       Recommendations:  -The cultures for gonorrhea and chlamydia were negative although 4+for strep C, Fusobacterium and the haemophilus hemolyticus.  -Is about 90% better compared to last visit, stop the steroids early due to not tolerating them has about 4 days left of the penicillin which they should complete  -Will begin clindamycin for 10 days specifically to cover the Fusobacterium  -To follow-up in 1 to 2 weeks if still not improving    The patient indicates understanding of these issues and agrees to the plan.  Longitudinal care will be provided with response to treatment possible further intervention    Aroldo Abdul MD  7/2/2024  11:35 AM

## 2024-07-12 ENCOUNTER — OFFICE VISIT (OUTPATIENT)
Dept: OTOLARYNGOLOGY | Facility: CLINIC | Age: 29
End: 2024-07-12

## 2024-07-12 VITALS — BODY MASS INDEX: 28.44 KG/M2 | HEIGHT: 72 IN | WEIGHT: 210 LBS

## 2024-07-12 DIAGNOSIS — J03.90 TONSILLITIS: Primary | ICD-10-CM

## 2024-07-12 PROCEDURE — 99213 OFFICE O/P EST LOW 20 MIN: CPT | Performed by: STUDENT IN AN ORGANIZED HEALTH CARE EDUCATION/TRAINING PROGRAM

## 2024-07-12 PROCEDURE — 3008F BODY MASS INDEX DOCD: CPT | Performed by: STUDENT IN AN ORGANIZED HEALTH CARE EDUCATION/TRAINING PROGRAM

## 2024-07-12 NOTE — PROGRESS NOTES
Re Jackson is a 28 year old adult.   Chief Complaint   Patient presents with    Follow - Up     Patient is here for tonsillitis f/up, pt still taking medications.     HPI:   28-year-old in follow-up regarding their tonsillitis.  Feeling much better back to baseline for the last several days now    Current Outpatient Medications   Medication Sig Dispense Refill    clindamycin 300 MG Oral Cap Take 1 capsule (300 mg total) by mouth 3 (three) times daily for 10 days. 30 capsule 0    polyethylene glycol, PEG 3350, 17 g Oral Powd Pack Take 17 g by mouth daily.      metoprolol succinate ER 25 MG Oral Tablet 24 Hr Take 1 tablet (25 mg total) by mouth daily. 90 tablet 1    mupirocin 2 % External Ointment Apply 1 Application topically daily. (Patient taking differently: Apply 1 Application topically daily. As needed) 1 each 0    clotrimazole 1 % External Cream Apply 1 Application topically daily. (Patient taking differently: Apply 1 Application topically daily. As needed) 1 each 0    Acetaminophen (TYLENOL OR)       IBUPROFEN OR       Progesterone Micronized 100 MG Oral Cap Take 1 capsule (100 mg total) by mouth daily.      spironolactone 100 MG Oral Tab Take 1 tablet (100 mg total) by mouth daily.      estradiol 2 MG Oral Tab Take 2 tablets (4 mg total) by mouth daily.      Melatonin 10 MG Oral Cap Take 1 tablet by mouth nightly as needed. 6mg        Past Medical History:    Acid reflux    Arrhythmia    elevated heart rate since covid    Esophageal reflux    High blood pressure    Spermatocele      Social History:  Social History     Socioeconomic History    Marital status:    Tobacco Use    Smoking status: Former    Smokeless tobacco: Never    Tobacco comments:     only for 3 months in 2017   Vaping Use    Vaping status: Never Used   Substance and Sexual Activity    Alcohol use: Not Currently     Alcohol/week: 6.0 standard drinks of alcohol     Types: 6 Standard drinks or equivalent per week     Comment: not  currently    Drug use: Yes     Frequency: 3.0 times per week     Types: Cannabis   Other Topics Concern    Caffeine Concern Yes     Comment: 2 can of coke every other day.     Exercise Yes     Comment: Takes long walks 3 times weekly.    Social History Narrative    Single    Work- [employer]-     Grad student.      Social Determinants of Health     Financial Resource Strain: Not on File (10/8/2022)    [facility] from [facility]    Financial Resource Strain     Financial Resource Strain: 0   Food Insecurity: Not on File (10/8/2022)    [facility] from [facility]    Food Insecurity     Food: 0   Transportation Needs: Not on File (10/8/2022)    [facility] from [facility]    Transportation Needs     Transportation: 0   Physical Activity: Not on File (10/8/2022)    [facility] from [facility]    Physical Activity     Physical Activity: 0   Stress: Not on File (10/8/2022)    [facility]    Stress     Stress: 0   Social Connections: Not on File (10/8/2022)    [facility] from [facility]    Social Connections     Social Connections and Isolation: 0   Housing Stability: Not on File (10/8/2022)    [facility] from [facility]    Housing Stability     Housin      Past Surgical History:   Procedure Laterality Date    Colonoscopy      Egd  2024    Dr. Enciso: Normal    Other      lipoma removal, L upper shoulder    Other      wisdom teeth     Other      lipoma removed from neck in 2016         EXAM:   Ht 6' 1\" (1.854 m)   Wt 210 lb (95.3 kg)   BMI 27.71 kg/m²     System Details   Skin Inspection - Normal.   Constitutional Overall appearance - Normal.   Head/Face Symmetric, TMJ tenderness not present    Eyes EOMI, PERRL   Right ear:  Canal clear, TM intact, no STEVEN   Left ear:  Canal clear, TM intact, no STEVEN   Nose: Septum midline, inferior turbinates not enlarged, nasal valves without collapse    Oral cavity/Oropharynx: No lesions or masses on inspection or palpation, tonsils symmetric and  the residual inflammation seen at the last visit is now improved   Neck: Soft without LAD, thyroid not enlarged  Voice clear/ no stridor   Other:      SCOPES AND PROCEDURES:         AUDIOGRAM AND IMAGING:         IMPRESSION:   1. Tonsillitis       Recommendations:  -28-year-old with chronic tonsillitis, cultures growing several bacteria, antibiotics tailored to this with clindamycin which the patient responded very well to.  Symptom-free and the exam looks back to normal.  -Discussed return precautions can follow-up as needed    The patient indicates understanding of these issues and agrees to the plan.      Aroldo Abdul MD  7/12/2024  11:13 AM

## 2024-07-20 ENCOUNTER — HOSPITAL ENCOUNTER (OUTPATIENT)
Age: 29
Discharge: HOME | End: 2024-07-20
Payer: COMMERCIAL

## 2024-07-20 ENCOUNTER — MOBILE ENCOUNTER (OUTPATIENT)
Dept: OTOLARYNGOLOGY | Facility: CLINIC | Age: 29
End: 2024-07-20

## 2024-07-20 VITALS
HEIGHT: 72 IN | SYSTOLIC BLOOD PRESSURE: 113 MMHG | BODY MASS INDEX: 28.44 KG/M2 | WEIGHT: 210 LBS | DIASTOLIC BLOOD PRESSURE: 72 MMHG | RESPIRATION RATE: 18 BRPM | OXYGEN SATURATION: 100 % | HEART RATE: 77 BPM | TEMPERATURE: 98 F

## 2024-07-20 DIAGNOSIS — J02.9 PHARYNGITIS, UNSPECIFIED ETIOLOGY: Primary | ICD-10-CM

## 2024-07-20 LAB
S PYO AG THROAT QL IA.RAPID: NEGATIVE
SARS-COV-2 RNA RESP QL NAA+PROBE: NOT DETECTED

## 2024-07-20 PROCEDURE — 99214 OFFICE O/P EST MOD 30 MIN: CPT

## 2024-07-20 PROCEDURE — 87205 SMEAR GRAM STAIN: CPT | Performed by: NURSE PRACTITIONER

## 2024-07-20 PROCEDURE — 87070 CULTURE OTHR SPECIMN AEROBIC: CPT | Performed by: NURSE PRACTITIONER

## 2024-07-20 PROCEDURE — 99213 OFFICE O/P EST LOW 20 MIN: CPT

## 2024-07-20 PROCEDURE — 87651 STREP A DNA AMP PROBE: CPT | Performed by: NURSE PRACTITIONER

## 2024-07-20 RX ORDER — CLARITHROMYCIN 500 MG/1
500 TABLET, COATED ORAL 2 TIMES DAILY
Qty: 28 TABLET | Refills: 0 | Status: SHIPPED | OUTPATIENT
Start: 2024-07-20 | End: 2024-08-03

## 2024-07-20 NOTE — DISCHARGE INSTRUCTIONS
Increase water intake 2-3 liters daily   You will be notified of any abnormalities with the wound culture of your throat that indicates the need to change treatment plan.  Otherwise please follow-up with your ear nose and throat specialist.  
no

## 2024-07-20 NOTE — ED PROVIDER NOTES
Patient Seen in: [facility]      History     Chief Complaint   Patient presents with    Sore Throat     Stated Complaint: tonsilitis: scratchy throat, pus and redness in throat/tonsils    Subjective:   HPI    29-year-old patient presents today with complaints of sore throat that started 24 hours ago.  Patient states that they have a history of a throat infection and see an ear nose and throat specialist.  Patient states that they have an appointment with ear nose and throat specialist next week.  Patient states that in the past they had to have in a row but culture taken of the throat for evaluation.  Patient states that they were tested for gonorrhea and chlamydia in the throat 3 weeks ago and this was negative and they deny any known reexposure to gonorrhea or chlamydia.    Objective:   Past Medical History:    Acid reflux    Arrhythmia    elevated heart rate since covid    Esophageal reflux    High blood pressure    Spermatocele              Past Surgical History:   Procedure Laterality Date    Colonoscopy      Egd  04/25/2024    Dr. Enciso: Normal    Other      lipoma removal, L upper shoulder    Other      wisdom teeth     Other      lipoma removed from neck in 2016                Social History     Socioeconomic History    Marital status:    Tobacco Use    Smoking status: Former    Smokeless tobacco: Never    Tobacco comments:     only for 3 months in 2017   Vaping Use    Vaping status: Never Used   Substance and Sexual Activity    Alcohol use: Not Currently     Alcohol/week: 6.0 standard drinks of alcohol     Types: 6 Standard drinks or equivalent per week     Comment: not currently    Drug use: Yes     Frequency: 3.0 times per week     Types: Cannabis   Other Topics Concern    Caffeine Concern Yes     Comment: 2 can of coke every other day.     Exercise Yes     Comment: Takes long walks 3 times weekly.    Social History Narrative    Single    Work- [employer]-      Grad student.      Social Determinants of Health     Financial Resource Strain: Not on File (10/8/2022)    Received from [facility]    Financial Resource Strain     Financial Resource Strain: 0   Food Insecurity: Not on File (10/8/2022)    Received from [facility]    Food Insecurity     Food: 0   Transportation Needs: Not on File (10/8/2022)    Received from [facility]    Transportation Needs     Transportation: 0   Physical Activity: Not on File (10/8/2022)    Received from [facility]    Physical Activity     Physical Activity: 0   Stress: Not on File (10/8/2022)    [facility] from [facility]    Stress     Stress: 0   Social Connections: Not on File (10/8/2022)    [facility] from [facility]    Social Connections     Social Connections and Isolation: 0   Housing Stability: Not on File (10/8/2022)    Received from [facility]    Housing Stability     Housin              Review of Systems   Constitutional: Negative.    HENT:  Positive for sore throat.    Eyes: Negative.    Respiratory: Negative.     Cardiovascular: Negative.    Gastrointestinal: Negative.    Endocrine: Negative.    Genitourinary: Negative.    Musculoskeletal: Negative.    Skin: Negative.    Allergic/Immunologic: Negative.    Neurological: Negative.    Hematological: Negative.    Psychiatric/Behavioral: Negative.         Positive for stated Chief Complaint: Sore Throat    Other systems are as noted in HPI.  Constitutional and vital signs reviewed.      All other systems reviewed and negative except as noted above.    Physical Exam     ED Triage Vitals [24 1516]   /72   Pulse 77   Resp 18   Temp 97.8 °F (36.6 °C)   Temp src Temporal   SpO2 100 %   O2 Device None (Room air)       Current Vitals:   Vital Signs  BP: 113/72  Pulse: 77  Resp: 18  Temp: 97.8 °F (36.6 °C)  Temp src: Temporal    Oxygen Therapy  SpO2: 100 %  O2 Device: None (Room air)            Physical Exam  Vitals and nursing note reviewed.   Constitutional:        Appearance: She is well-developed and normal weight.   HENT:      Head: Normocephalic.      Mouth/Throat:      Mouth: Mucous membranes are moist.      Pharynx: No posterior oropharyngeal erythema.      Tonsils: No tonsillar exudate or tonsillar abscesses. 1+ on the right. 1+ on the left.   Eyes:      Conjunctiva/sclera: Conjunctivae normal.      Pupils: Pupils are equal, round, and reactive to light.   Cardiovascular:      Rate and Rhythm: Normal rate and regular rhythm.      Heart sounds: Normal heart sounds.   Pulmonary:      Effort: Pulmonary effort is normal.      Breath sounds: Normal breath sounds.   Neurological:      Mental Status: She is alert.   Psychiatric:         Mood and Affect: Mood normal.             ED Course     Labs Reviewed   RAPID STREP A - Normal   RAPID SARS-COV-2 BY PCR - Normal   AEROBIC BACTERIAL CULTURE                      MDM      29-year-old patient presents today with complaints of sore throat that started 24 hours ago.  Patient states that they have a history of a throat infection and see an ear nose and throat specialist.  Patient states that they have an appointment with ear nose and throat specialist next week.  Patient states that in the past they had to have in a row but culture taken of the throat for evaluation.  Patient states that they were tested for gonorrhea and chlamydia in the throat 3 weeks ago and this was negative and they deny any known reexposure to gonorrhea or chlamydia.  Vital signs: Please see EMR.  Physical exam: Please see exam.  Differential diagnosis: Tonsillitis, strep throat, pharyngitis.  Recent Results (from the past 24 hour(s))   Rapid Strep A - ID NOW    Collection Time: 07/20/24  3:22 PM    Specimen: Throat; Other   Result Value Ref Range    Strep A by PCR Negative Negative   Rapid SARS-CoV-2 by PCR    Collection Time: 07/20/24  3:22 PM    Specimen: Nares; Other   Result Value Ref Range    Rapid SARS-CoV-2 by PCR Not Detected Not Detected       Will  diagnosed with pharyngitis/tonsillitis.  Strep PCR was negative wound culture of throat obtained and will notify patient of any abnormalities with the wound culture before making any treatment plan.  Patient declined viscous lidocaine.  Patient instructed to follow-up with their ear nose and throat specialist.                                   Medical Decision Making  29-year-old patient presents today with complaints of sore throat that started 24 hours ago.  Patient states that they have a history of a throat infection and see an ear nose and throat specialist.  Patient states that they have an appointment with ear nose and throat specialist next week.  Patient states that in the past they had to have in a row but culture taken of the throat for evaluation.  Patient states that they were tested for gonorrhea and chlamydia in the throat 3 weeks ago and this was negative and they deny any known reexposure to gonorrhea or chlamydia.    Amount and/or Complexity of Data Reviewed  Labs: ordered. Decision-making details documented in ED Course.        Disposition and Plan     Clinical Impression:  1. Pharyngitis, unspecified etiology         Disposition:  Discharge  7/20/2024  3:39 pm    Follow-up:  Aroldo Abdul MD  [address] [place] [id number]  [phone]    In 1 week            Medications Prescribed:  Current Discharge Medication List

## 2024-07-22 ENCOUNTER — OFFICE VISIT (OUTPATIENT)
Dept: OTOLARYNGOLOGY | Facility: CLINIC | Age: 29
End: 2024-07-22

## 2024-07-22 DIAGNOSIS — J03.91 RECURRENT TONSILLITIS: Primary | ICD-10-CM

## 2024-07-22 PROCEDURE — G2211 COMPLEX E/M VISIT ADD ON: HCPCS | Performed by: STUDENT IN AN ORGANIZED HEALTH CARE EDUCATION/TRAINING PROGRAM

## 2024-07-22 PROCEDURE — 99214 OFFICE O/P EST MOD 30 MIN: CPT | Performed by: STUDENT IN AN ORGANIZED HEALTH CARE EDUCATION/TRAINING PROGRAM

## 2024-07-22 RX ORDER — MONTELUKAST SODIUM 10 MG/1
10 TABLET ORAL NIGHTLY
Qty: 30 TABLET | Refills: 3 | Status: SHIPPED | OUTPATIENT
Start: 2024-07-22

## 2024-07-22 RX ORDER — LEVOCETIRIZINE DIHYDROCHLORIDE 5 MG/1
5 TABLET, FILM COATED ORAL EVERY EVENING
Qty: 30 TABLET | Refills: 0 | Status: SHIPPED | OUTPATIENT
Start: 2024-07-22

## 2024-07-22 RX ORDER — PREDNISONE 20 MG/1
20 TABLET ORAL DAILY
Qty: 7 TABLET | Refills: 0 | Status: SHIPPED | OUTPATIENT
Start: 2024-07-22 | End: 2024-07-29

## 2024-07-22 RX ORDER — AZELASTINE 1 MG/ML
2 SPRAY, METERED NASAL 2 TIMES DAILY
Qty: 30 ML | Refills: 0 | Status: SHIPPED | OUTPATIENT
Start: 2024-07-22

## 2024-07-22 NOTE — PROGRESS NOTES
Re Jackson is a 29 year old adult.   Chief Complaint   Patient presents with    Follow - Up     Patient is here for tonsils problem patient reports throat pain reports swollen throat  bad taste little difficult  to swallow patient reports feeling tired x 1 month      HPI:   29-year-old in follow-up regarding recurrent tonsillitis.  Is now having pain again since over the weekend.  I called the patient in a course of clarithromyc and symptoms are beginning to improve    Current Outpatient Medications   Medication Sig Dispense Refill    predniSONE 20 MG Oral Tab Take 1 tablet (20 mg total) by mouth daily for 7 days. 7 tablet 0    montelukast 10 MG Oral Tab Take 1 tablet (10 mg total) by mouth nightly. 30 tablet 3    azelastine 0.1 % Nasal Solution 2 sprays by Nasal route 2 (two) times daily. 30 mL 0    levocetirizine 5 MG Oral Tab Take 1 tablet (5 mg total) by mouth every evening. 30 tablet 0    clarithromycin 500 MG Oral Tab Take 1 tablet (500 mg total) by mouth 2 (two) times daily for 14 days. 28 tablet 0    polyethylene glycol, PEG 3350, 17 g Oral Powd Pack Take 17 g by mouth daily.      metoprolol succinate ER 25 MG Oral Tablet 24 Hr Take 1 tablet (25 mg total) by mouth daily. 90 tablet 1    IBUPROFEN OR       Progesterone Micronized 100 MG Oral Cap Take 1 capsule (100 mg total) by mouth daily.      spironolactone 100 MG Oral Tab Take 1 tablet (100 mg total) by mouth daily.      estradiol 2 MG Oral Tab Take 2 tablets (4 mg total) by mouth daily.      Melatonin 10 MG Oral Cap Take 1 tablet by mouth nightly as needed. 6mg      mupirocin 2 % External Ointment Apply 1 Application topically daily. (Patient not taking: Reported on 7/20/2024) 1 each 0    clotrimazole 1 % External Cream Apply 1 Application topically daily. (Patient not taking: Reported on 7/20/2024) 1 each 0    Acetaminophen (TYLENOL OR)  (Patient not taking: Reported on 7/20/2024)        Past Medical History:    Acid reflux    Arrhythmia     elevated heart rate since covid    Esophageal reflux    High blood pressure    Spermatocele      Social History:  Social History     Socioeconomic History    Marital status:    Tobacco Use    Smoking status: Former    Smokeless tobacco: Never    Tobacco comments:     only for 3 months in 2017   Vaping Use    Vaping status: Never Used   Substance and Sexual Activity    Alcohol use: Not Currently     Alcohol/week: 6.0 standard drinks of alcohol     Types: 6 Standard drinks or equivalent per week     Comment: not currently    Drug use: Yes     Frequency: 3.0 times per week     Types: Cannabis   Other Topics Concern    Caffeine Concern Yes     Comment: 2 can of coke every other day.     Exercise Yes     Comment: Takes long walks 3 times weekly.    Social History Narrative    Single    Work- [employer]-     Grad student.      Social Determinants of Health     Financial Resource Strain: Not on File (10/8/2022)    [facility] from [facility]    Financial Resource Strain     Financial Resource Strain: 0   Food Insecurity: Not on File (10/8/2022)    [facility] from [facility]    Food Insecurity     Food: 0   Transportation Needs: Not on File (10/8/2022)    [facility] from [facility]    Transportation Needs     Transportation: 0   Physical Activity: Not on File (10/8/2022)    [facility] from [facility]    Physical Activity     Physical Activity: 0   Stress: Not on File (10/8/2022)    [facility] from [facility]    Stress     Stress: 0   Social Connections: Not on File (10/8/2022)    [facility] from [facility]    Social Connections     Social Connections and Isolation: 0   Housing Stability: Not on File (10/8/2022)    [facility] from [facility]    Housing Stability     Housin      Past Surgical History:   Procedure Laterality Date    Colonoscopy      Egd  2024    Dr. Enciso: Normal    Other      lipoma removal, L upper shoulder    Other      wisdom teeth     Other      lipoma removed from neck  in 2016         EXAM:   There were no vitals taken for this visit.    System Details   Skin Inspection - Normal.   Constitutional Overall appearance - Normal.   Head/Face Symmetric, TMJ tenderness not present    Eyes EOMI, PERRL   Right ear:  Canal clear, TM intact, no STEVEN   Left ear:  Canal clear, TM intact, no STEVEN   Nose: Septum midline, inferior turbinates not enlarged, nasal valves without collapse    Oral cavity/Oropharynx: No lesions or masses on inspection or palpation  Right tonsil mildly inflamed with overlying exudate.  Left anterior tonsillar pillar is inflamed   Neck: Soft without LAD, thyroid not enlarged  Voice clear/ no stridor   Other:      SCOPES AND PROCEDURES:         AUDIOGRAM AND IMAGING:         IMPRESSION:   1. Recurrent tonsillitis       Recommendations:  -Now has a third bout of tonsillitis on both sides.  Previous cultures with strep C, haemophilus and Fusobacterium.  -New culture was taken over the weekend but still not finalized  -Will continue the clarithromycin for 2 weeks and diluted hydrogen peroxide gargles  -Will also trial allergy regimen for possible postnasal drip that is contributing to tonsillar inflammation  -Will prescribe a low-dose of prednisone for 7 days  -May consider testing close contacts for similar bacteria    The patient indicates understanding of these issues and agrees to the plan.  Longitudinal care will be provided with follow up and response to tx    Aroldo Abdul MD  7/22/2024  1:15 PM

## 2024-08-06 ENCOUNTER — TELEPHONE (OUTPATIENT)
Dept: OTOLARYNGOLOGY | Facility: CLINIC | Age: 29
End: 2024-08-06

## 2024-08-06 NOTE — TELEPHONE ENCOUNTER
Patient calling because the sore throat and pain is back after finishing antibiotics 4 days ago. Patient states tonsillitis keeps returning. Patient would like a call back to discuss next steps. Appointment offered but patient declined because they would like to discuss with Dr. Abdul. Please call.

## 2024-08-07 ENCOUNTER — TELEPHONE (OUTPATIENT)
Dept: OTOLARYNGOLOGY | Facility: CLINIC | Age: 29
End: 2024-08-07

## 2024-08-07 ENCOUNTER — OFFICE VISIT (OUTPATIENT)
Dept: OTOLARYNGOLOGY | Facility: CLINIC | Age: 29
End: 2024-08-07

## 2024-08-07 DIAGNOSIS — J03.91 RECURRENT TONSILLITIS: Primary | ICD-10-CM

## 2024-08-07 PROCEDURE — G2211 COMPLEX E/M VISIT ADD ON: HCPCS | Performed by: STUDENT IN AN ORGANIZED HEALTH CARE EDUCATION/TRAINING PROGRAM

## 2024-08-07 PROCEDURE — 99213 OFFICE O/P EST LOW 20 MIN: CPT | Performed by: STUDENT IN AN ORGANIZED HEALTH CARE EDUCATION/TRAINING PROGRAM

## 2024-08-07 NOTE — PROGRESS NOTES
Re Jackson is a 29 year old adult.   Chief Complaint   Patient presents with    Tonsil Problem     Patient is here for tonsils pan patient reports having difficult to swallow 3 time in one month.     HPI:   29-year-old presents for evaluation of sore throat.  Has been treated for tonsillitis about 3 times in the last 1 to 2 months and the antibiotics do help including the last course of clarithromycin although the patient felt yesterday that the throat pain was returning on the right side although upon waking up this morning is resolved    Current Outpatient Medications   Medication Sig Dispense Refill    montelukast 10 MG Oral Tab Take 1 tablet (10 mg total) by mouth nightly. 30 tablet 3    azelastine 0.1 % Nasal Solution 2 sprays by Nasal route 2 (two) times daily. 30 mL 0    levocetirizine 5 MG Oral Tab Take 1 tablet (5 mg total) by mouth every evening. 30 tablet 0    polyethylene glycol, PEG 3350, 17 g Oral Powd Pack Take 17 g by mouth daily.      metoprolol succinate ER 25 MG Oral Tablet 24 Hr Take 1 tablet (25 mg total) by mouth daily. 90 tablet 1    IBUPROFEN OR       Progesterone Micronized 100 MG Oral Cap Take 1 capsule (100 mg total) by mouth daily.      spironolactone 100 MG Oral Tab Take 1 tablet (100 mg total) by mouth daily.      estradiol 2 MG Oral Tab Take 2 tablets (4 mg total) by mouth daily.      Melatonin 10 MG Oral Cap Take 1 tablet by mouth nightly as needed. 6mg      mupirocin 2 % External Ointment Apply 1 Application topically daily. (Patient not taking: Reported on 7/20/2024) 1 each 0    clotrimazole 1 % External Cream Apply 1 Application topically daily. (Patient not taking: Reported on 7/20/2024) 1 each 0    Acetaminophen (TYLENOL OR)  (Patient not taking: Reported on 7/20/2024)        Past Medical History:    Acid reflux    Arrhythmia    elevated heart rate since covid    Esophageal reflux    High blood pressure    Spermatocele      Social History:  Social History     Socioeconomic  History    Marital status:    Tobacco Use    Smoking status: Former    Smokeless tobacco: Never    Tobacco comments:     only for 3 months in 2017   Vaping Use    Vaping status: Never Used   Substance and Sexual Activity    Alcohol use: Not Currently     Alcohol/week: 6.0 standard drinks of alcohol     Types: 6 Standard drinks or equivalent per week     Comment: not currently    Drug use: Yes     Frequency: 3.0 times per week     Types: Cannabis   Other Topics Concern    Caffeine Concern Yes     Comment: 2 can of coke every other day.     Exercise Yes     Comment: Takes long walks 3 times weekly.    Social History Narrative    Single    Work- [employer]-     Grad student.      Social Determinants of Health     Financial Resource Strain: Not on File (10/8/2022)    [facility] from [facility]    Financial Resource Strain     Financial Resource Strain: 0   Food Insecurity: Not on File (10/8/2022)    [facility] from [facility]    Food Insecurity     Food: 0   Transportation Needs: Not on File (10/8/2022)    [facility] from [facility]    Transportation Needs     Transportation: 0   Physical Activity: Not on File (10/8/2022)    [facility] from [facility]    Physical Activity     Physical Activity: 0   Stress: Not on File (10/8/2022)    [facility] from [facility]    Stress     Stress: 0   Social Connections: Not on File (10/8/2022)    [facility] from [facility]    Social Connections     Social Connections and Isolation: 0   Housing Stability: Not on File (10/8/2022)    [facility] from [facility]    Housing Stability     Housin      Past Surgical History:   Procedure Laterality Date    Colonoscopy      Egd  2024    Dr. Enciso: Normal    Other      lipoma removal, L upper shoulder    Other      wisdom teeth     Other      lipoma removed from neck in 2016         EXAM:   There were no vitals taken for this visit.    System Details   Skin Inspection - Normal.   Constitutional Overall  appearance - Normal.   Head/Face Symmetric, TMJ tenderness not present    Eyes EOMI, PERRL   Right ear:  Canal clear, TM intact, no STEVEN   Left ear:  Canal clear, TM intact, no STEVEN   Nose: Septum midline, inferior turbinates not enlarged, nasal valves without collapse    Oral cavity/Oropharynx: No lesions or masses on inspection or palpation, tonsils symmetric    Neck: Soft without LAD, thyroid not enlarged  Voice clear/ no stridor   Other:      SCOPES AND PROCEDURES:         AUDIOGRAM AND IMAGING:         IMPRESSION:   1. Recurrent tonsillitis  - Infectious Disease Referral - In Network       Recommendations:  -Normal exam no erythema or evidence of tonsillitis  -Patient's partner was also positive for strep not group A which was treated with 2 weeks of oral antibiotics with improvement in their symptoms as well  -Will place a referral to infectious disease for opinion on the situation and options if this continues to return  -Would like to avoid tonsillectomy if possible    The patient indicates understanding of these issues and agrees to the plan.  Longitudinal care will be provided as the point of contact for patient's recurrent tonsillitis issue    Aroldo Abdul MD  8/7/2024  11:25 AM

## 2024-08-07 NOTE — TELEPHONE ENCOUNTER
Message sent to Dr. Abdul for recommendations, patient still has throat pain after completing treatment.     Patient was seen today in the office.

## 2024-08-12 RX ORDER — LEVOCETIRIZINE DIHYDROCHLORIDE 5 MG/1
5 TABLET, FILM COATED ORAL EVERY EVENING
Qty: 30 TABLET | Refills: 0 | Status: SHIPPED | OUTPATIENT
Start: 2024-08-12

## 2024-08-12 RX ORDER — AZELASTINE HYDROCHLORIDE 137 UG/1
2 SPRAY, METERED NASAL 2 TIMES DAILY
Qty: 30 ML | Refills: 1 | Status: SHIPPED | OUTPATIENT
Start: 2024-08-12

## 2024-09-09 ENCOUNTER — TELEPHONE (OUTPATIENT)
Dept: FAMILY MEDICINE CLINIC | Facility: CLINIC | Age: 29
End: 2024-09-09

## 2024-09-09 NOTE — TELEPHONE ENCOUNTER
See comments and triage if necessary    Check up after covid     Future Appointments   Date Time Provider Department Center   9/12/2024  1:00 PM Ryley Chandra MD [facility]

## 2024-09-12 NOTE — TELEPHONE ENCOUNTER
Patient cancelled appointment. Patient is active on MyChart.  MyChart message sent to patient. Postponed message to ensure read.  If not read, nurse will reach out to patient.

## 2024-09-24 RX ORDER — LEVOCETIRIZINE DIHYDROCHLORIDE 5 MG/1
5 TABLET, FILM COATED ORAL EVERY EVENING
Qty: 30 TABLET | Refills: 0 | Status: SHIPPED | OUTPATIENT
Start: 2024-09-24

## 2024-10-07 RX ORDER — LEVOCETIRIZINE DIHYDROCHLORIDE 5 MG/1
5 TABLET, FILM COATED ORAL EVERY EVENING
Qty: 30 TABLET | Refills: 2 | Status: SHIPPED | OUTPATIENT
Start: 2024-10-07

## 2024-10-16 ENCOUNTER — OFFICE VISIT (OUTPATIENT)
Dept: FAMILY MEDICINE CLINIC | Facility: CLINIC | Age: 29
End: 2024-10-16
Payer: COMMERCIAL

## 2024-10-16 VITALS
OXYGEN SATURATION: 99 % | WEIGHT: 226 LBS | DIASTOLIC BLOOD PRESSURE: 76 MMHG | HEART RATE: 93 BPM | TEMPERATURE: 98 F | BODY MASS INDEX: 30 KG/M2 | SYSTOLIC BLOOD PRESSURE: 118 MMHG

## 2024-10-16 DIAGNOSIS — Z23 NEED FOR VACCINATION: ICD-10-CM

## 2024-10-16 DIAGNOSIS — J01.01 ACUTE RECURRENT MAXILLARY SINUSITIS: Primary | ICD-10-CM

## 2024-10-16 PROCEDURE — 90656 IIV3 VACC NO PRSV 0.5 ML IM: CPT | Performed by: FAMILY MEDICINE

## 2024-10-16 PROCEDURE — 99213 OFFICE O/P EST LOW 20 MIN: CPT | Performed by: FAMILY MEDICINE

## 2024-10-16 PROCEDURE — 3078F DIAST BP <80 MM HG: CPT | Performed by: FAMILY MEDICINE

## 2024-10-16 PROCEDURE — 3074F SYST BP LT 130 MM HG: CPT | Performed by: FAMILY MEDICINE

## 2024-10-16 PROCEDURE — 90471 IMMUNIZATION ADMIN: CPT | Performed by: FAMILY MEDICINE

## 2024-10-16 RX ORDER — PROGESTERONE 100 MG/1
100 CAPSULE ORAL NIGHTLY
COMMUNITY
Start: 2024-10-10

## 2024-10-16 RX ORDER — LEVOFLOXACIN 500 MG/1
500 TABLET, FILM COATED ORAL DAILY
Qty: 10 TABLET | Refills: 0 | Status: SHIPPED | OUTPATIENT
Start: 2024-10-16 | End: 2024-10-26

## 2024-10-16 NOTE — PROGRESS NOTES
/76   Pulse 93   Temp 97.6 °F (36.4 °C) (Oral)   Wt 226 lb (102.5 kg)   SpO2 99%   BMI 29.82 kg/m²               Chief Complaint   Patient presents with    Headache     C/o since Sunday off and on         HPI;    Re Jackson is a 29 year old adult.  Who is here today complaining of headache for last 2 weeks  Patient was seen at urgent care and was started on Xyzal as well as amoxicillin  The headache got a little bit better but he continues to have the pain  Located in the bilateral maxillary area  The headache gets worse when she is talking loudly or singing  The headache got worse in last 3 days prompting him to come here today  He denies any vision problem  Denies any dizziness  He occasionally has some nausea at the peak of the headache      Wt Readings from Last 3 Encounters:   10/16/24 226 lb (102.5 kg)   07/20/24 210 lb (95.3 kg)   07/12/24 210 lb (95.3 kg)     BP Readings from Last 3 Encounters:   10/16/24 118/76   07/20/24 113/72   06/24/24 139/73         ALLERGIES:  Allergies[1]  Current Outpatient Medications   Medication Sig Dispense Refill    progesterone 100 MG Oral Cap Take 1 capsule (100 mg total) by mouth nightly.      levoFLOXacin (LEVAQUIN) 500 MG Oral Tab Take 1 tablet (500 mg total) by mouth daily for 10 days. 10 tablet 0    levocetirizine 5 MG Oral Tab TAKE ONE TABLET BY MOUTH EVERY EVENING 30 tablet 2    azelastine 137 MCG/SPRAY Nasal Solution INSTILL 2 SPRAYS IN EACH NOSTRIL TWICE DAILY 30 mL 1    polyethylene glycol, PEG 3350, 17 g Oral Powd Pack Take 17 g by mouth daily.      metoprolol succinate ER 25 MG Oral Tablet 24 Hr Take 1 tablet (25 mg total) by mouth daily. 90 tablet 1    mupirocin 2 % External Ointment Apply 1 Application topically daily. 1 each 0    clotrimazole 1 % External Cream Apply 1 Application topically daily. 1 each 0    Acetaminophen (TYLENOL OR)       IBUPROFEN OR       spironolactone 100 MG Oral Tab Take 1 tablet (100 mg total) by mouth daily.       estradiol 2 MG Oral Tab Take 3 tablets (6 mg total) by mouth daily.      Melatonin 10 MG Oral Cap Take 1 tablet by mouth nightly as needed. 6mg        Past Medical History:    Acid reflux    Arrhythmia    elevated heart rate since covid    Esophageal reflux    High blood pressure    Spermatocele      Social History:  Social History     Socioeconomic History    Marital status:    Tobacco Use    Smoking status: Former    Smokeless tobacco: Never    Tobacco comments:     only for 3 months in 2017   Vaping Use    Vaping status: Never Used   Substance and Sexual Activity    Alcohol use: Not Currently     Alcohol/week: 6.0 standard drinks of alcohol     Types: 6 Standard drinks or equivalent per week     Comment: not currently    Drug use: Yes     Frequency: 3.0 times per week     Types: Cannabis   Other Topics Concern    Caffeine Concern Yes     Comment: 2 can of coke every other day.     Exercise Yes     Comment: Takes long walks 3 times weekly.    Social History Narrative    Single    Work- [employer]-     Grad student.      Social Drivers of Health     Financial Resource Strain: Not on File (10/8/2022)    Received from [facility]    Financial Resource Strain     Financial Resource Strain: 0   Food Insecurity: Not on File (2024)    Received from [facility]    Food Insecurity     Food: 0   Transportation Needs: Not on File (10/8/2022)    Received from [facility]    Transportation Needs     Transportation: 0   Physical Activity: Not on File (10/8/2022)    Received from [facility]    Physical Activity     Physical Activity: 0   Stress: Not on File (10/8/2022)    Received from [facility]    Stress     Stress: 0   Social Connections: Not on File (2024)    Received from [facility]    Social Connections     Connectedness: 0   Housing Stability: Not on File (10/8/2022)    Received from [facility]    Housing Stability     Housin        REVIEW OF SYSTEMS:   GENERAL HEALTH: feels well  otherwise  SKIN: denies any unusual skin lesions or rashes  RESPIRATORY: denies shortness of breath with exertion  CARDIOVASCULAR: denies chest pain on exertion  GI: denies abdominal pain and denies heartburn  NEURO: denies headaches  EXAM:   /76   Pulse 93   Temp 97.6 °F (36.4 °C) (Oral)   Wt 226 lb (102.5 kg)   SpO2 99%   BMI 29.82 kg/m²   No distress, good color on room air.  Alert and cooperative.  HEENT:clear nasal discharge,hypertrophy of turbinates  bilat serous effusion   post nasal drip on post pharengeal wall  Bilateral maxillary tenderness  Neck:no nodes, thyromegaly, JVD, or carotid bruits.  Lungs:Clear to auscultation and percussion, breath sounds are equal.  Heart:Rate and rhythm regular.  No murmurs, gallops, or rubs.  Skin:No Rashes.     ASSESSMENT AND PLAN:   Diagnoses and all orders for this visit:    Acute recurrent maxillary sinusitis  Patient is advised to continue Xyzal and Astelin  She should take Tylenol Motrin for headache  I am starting her on levofloxacin 500 mg p.o. daily  Patient will follow-up in 2 weeks  Need for vaccination  -     Fluzone trivalent vaccine, PF 0.5mL, 6mo+ (42507)    Other orders  -     levoFLOXacin (LEVAQUIN) 500 MG Oral Tab; Take 1 tablet (500 mg total) by mouth daily for 10 days.        The patient indicates understanding of these issues and agrees to the plan.  Imaging & Consults:  INFLUENZA VACCINE, TRI, PRESERV FREE, 0.5 ML  Meds & Refills for this Visit:  Requested Prescriptions     Signed Prescriptions Disp Refills    levoFLOXacin (LEVAQUIN) 500 MG Oral Tab 10 tablet 0     Sig: Take 1 tablet (500 mg total) by mouth daily for 10 days.     Orders Placed This Encounter   Procedures    Fluzone trivalent vaccine, PF 0.5mL, 6mo+ (29987)             Ryley Chandra MD   [facility]  [address]  [facility] [address]    Electronically signed    This dictation was performed with a verbal recognition program (DRAGON) and it was checked for  errors. It is possible that there are still dictated errors within this office note. If so, please bring any errors to my attention for an addendum. All efforts were made to ensure that this office note is accurate                   [1]   Allergies  Allergen Reactions    Dander HIVES     Dogs & cats. Some dog breeds = Hives; but usually, just sneezing    Adhesive Tape RASH

## 2024-10-29 ENCOUNTER — OFFICE VISIT (OUTPATIENT)
Dept: OTOLARYNGOLOGY | Facility: CLINIC | Age: 29
End: 2024-10-29

## 2024-10-29 DIAGNOSIS — H93.8X3 SENSATION OF FULLNESS IN BOTH EARS: ICD-10-CM

## 2024-10-29 DIAGNOSIS — R44.8 FACIAL PRESSURE: ICD-10-CM

## 2024-10-29 DIAGNOSIS — M26.609 TMJ (TEMPOROMANDIBULAR JOINT DISORDER): Primary | ICD-10-CM

## 2024-10-29 PROCEDURE — 92567 TYMPANOMETRY: CPT | Performed by: STUDENT IN AN ORGANIZED HEALTH CARE EDUCATION/TRAINING PROGRAM

## 2024-10-29 PROCEDURE — 99213 OFFICE O/P EST LOW 20 MIN: CPT | Performed by: STUDENT IN AN ORGANIZED HEALTH CARE EDUCATION/TRAINING PROGRAM

## 2024-10-29 PROCEDURE — 92504 EAR MICROSCOPY EXAMINATION: CPT | Performed by: STUDENT IN AN ORGANIZED HEALTH CARE EDUCATION/TRAINING PROGRAM

## 2024-10-29 NOTE — PROGRESS NOTES
Re Jackson is a 29 year old adult.   Chief Complaint   Patient presents with    Sinus Problem     Patient is here for sinus infection reports pressure on sinus area reports having liquid on her both ears  x 5 months reports headaches x 1 months.     HPI:   29-year-old presents for evaluation of her ears.  Reports having been told there is fluid in her ears multiple times.  Has recently been treated for a sinusitis as well    Current Outpatient Medications   Medication Sig Dispense Refill    progesterone 100 MG Oral Cap Take 1 capsule (100 mg total) by mouth nightly.      levocetirizine 5 MG Oral Tab TAKE ONE TABLET BY MOUTH EVERY EVENING 30 tablet 2    azelastine 137 MCG/SPRAY Nasal Solution INSTILL 2 SPRAYS IN EACH NOSTRIL TWICE DAILY 30 mL 1    polyethylene glycol, PEG 3350, 17 g Oral Powd Pack Take 17 g by mouth daily.      metoprolol succinate ER 25 MG Oral Tablet 24 Hr Take 1 tablet (25 mg total) by mouth daily. 90 tablet 1    mupirocin 2 % External Ointment Apply 1 Application topically daily. 1 each 0    clotrimazole 1 % External Cream Apply 1 Application topically daily. 1 each 0    Acetaminophen (TYLENOL OR)       IBUPROFEN OR       spironolactone 100 MG Oral Tab Take 1 tablet (100 mg total) by mouth daily.      estradiol 2 MG Oral Tab Take 3 tablets (6 mg total) by mouth daily.      Melatonin 10 MG Oral Cap Take 1 tablet by mouth nightly as needed. 6mg        Past Medical History:    Acid reflux    Arrhythmia    elevated heart rate since covid    Esophageal reflux    High blood pressure    Spermatocele      Social History:  Social History     Socioeconomic History    Marital status:    Tobacco Use    Smoking status: Former    Smokeless tobacco: Never    Tobacco comments:     only for 3 months in 2017   Vaping Use    Vaping status: Never Used   Substance and Sexual Activity    Alcohol use: Not Currently     Alcohol/week: 6.0 standard drinks of alcohol     Types: 6 Standard drinks or  equivalent per week     Comment: not currently    Drug use: Yes     Frequency: 3.0 times per week     Types: Cannabis   Other Topics Concern    Caffeine Concern Yes     Comment: 2 can of coke every other day.     Exercise Yes     Comment: Takes long walks 3 times weekly.    Social History Narrative    Single    Work- [employer]-     Grad student.      Social Drivers of Health     Financial Resource Strain: Not on File (10/8/2022)    Received from [facility]    Financial Resource Strain     Financial Resource Strain: 0   Food Insecurity: Not on File (2024)    Received from [facility]    Food Insecurity     Food: 0   Transportation Needs: Not on File (10/8/2022)    Received from [facility]    Transportation Needs     Transportation: 0   Physical Activity: Not on File (10/8/2022)    Received from [facility]    Physical Activity     Physical Activity: 0   Stress: Not on File (10/8/2022)    Received from [facility]    Stress     Stress: 0   Social Connections: Not on File (2024)    Received from [facility]    Social Connections     Connectedness: 0   Housing Stability: Not on File (10/8/2022)    Received from [facility]    Housing Stability     Housin      Past Surgical History:   Procedure Laterality Date    Colonoscopy      Egd  2024    Dr. Enciso: Normal    Other      lipoma removal, L upper shoulder    Other      wisdom teeth     Other      lipoma removed from neck in 2016         EXAM:   There were no vitals taken for this visit.    System Details   Skin Inspection - Normal.   Constitutional Overall appearance - Normal.   Head/Face Symmetric, TMJ tenderness not present    Eyes EOMI, PERRL   Right ear:  Canal clear, TM intact, no STEVEN   Left ear:  Canal clear, TM intact, no STEVEN   Nose: Septum midline, inferior turbinates not enlarged, nasal valves without collapse    Oral cavity/Oropharynx: No lesions or masses on inspection or palpation, tonsils symmetric    Neck: Soft without  LAD, thyroid not enlarged  Voice clear/ no stridor   Other:      SCOPES AND PROCEDURES:         AUDIOGRAM AND IMAGING:         IMPRESSION:   1. TMJ (temporomandibular joint disorder)    2. Sensation of fullness in both ears  - Audiology Exam    3. Facial pressure       Recommendations:  -Had a normal ear exam and normal tympanograms indicating normal eustachian tube function  -Significant history of bruxism.  Suspect a referred ear fullness from a chronic TMD issue  -Discussed meeting with a dentist to consider a nightguard during sleep  -If has symptoms of a sinusitis or ear infection can come in so I can perform nasal endoscopy to see if there is active purulence draining from her sinuses    The patient indicates understanding of these issues and agrees to the plan.      Aroldo Abdul MD  10/29/2024  11:03 AM

## 2024-10-31 ENCOUNTER — HOSPITAL ENCOUNTER (OUTPATIENT)
Age: 29
Discharge: HOME | End: 2024-10-31
Payer: COMMERCIAL

## 2024-10-31 ENCOUNTER — TELEPHONE (OUTPATIENT)
Dept: FAMILY MEDICINE CLINIC | Facility: CLINIC | Age: 29
End: 2024-10-31

## 2024-10-31 VITALS
BODY MASS INDEX: 29.12 KG/M2 | HEART RATE: 84 BPM | WEIGHT: 215 LBS | OXYGEN SATURATION: 98 % | RESPIRATION RATE: 20 BRPM | TEMPERATURE: 97 F | SYSTOLIC BLOOD PRESSURE: 147 MMHG | DIASTOLIC BLOOD PRESSURE: 87 MMHG | HEIGHT: 72 IN

## 2024-10-31 DIAGNOSIS — G89.29 CHRONIC NONINTRACTABLE HEADACHE, UNSPECIFIED HEADACHE TYPE: Primary | ICD-10-CM

## 2024-10-31 DIAGNOSIS — R51.9 CHRONIC NONINTRACTABLE HEADACHE, UNSPECIFIED HEADACHE TYPE: Primary | ICD-10-CM

## 2024-10-31 PROCEDURE — 99213 OFFICE O/P EST LOW 20 MIN: CPT

## 2024-10-31 RX ORDER — NAPROXEN 500 MG/1
500 TABLET ORAL 2 TIMES DAILY PRN
Qty: 14 TABLET | Refills: 0 | Status: SHIPPED | OUTPATIENT
Start: 2024-10-31 | End: 2024-11-07

## 2024-10-31 NOTE — ED PROVIDER NOTES
Patient Seen in: [facility]      History   No chief complaint on file.    Stated Complaint: headaches    Subjective:   HPI  29-year-old nonbinary with htn, spermatocele, reflux, arrhythmia presents complaining of headache intermittently for over a month that they state occurs after singing, straining, and sexual intercourse and is relieved with Advil.  Patient was treated with amoxicillin and Levaquin for sinusitis but saw other ENT who states is not sinusitis.  They state the pain is usually behind the right eye.  Patient has follow-up with the primary care doctor on Wednesday.  Their last MRI was in 2017      Objective:     Past Medical History:    Acid reflux    Arrhythmia    elevated heart rate since covid    Esophageal reflux    High blood pressure    Spermatocele              Past Surgical History:   Procedure Laterality Date    Colonoscopy      Egd  04/25/2024    Dr. Enciso: Normal    Other      lipoma removal, L upper shoulder    Other      wisdom teeth     Other      lipoma removed from neck in 2016                Social History     Socioeconomic History    Marital status:    Tobacco Use    Smoking status: Former    Smokeless tobacco: Never    Tobacco comments:     only for 3 months in 2017   Vaping Use    Vaping status: Never Used   Substance and Sexual Activity    Alcohol use: Not Currently     Alcohol/week: 6.0 standard drinks of alcohol     Types: 6 Standard drinks or equivalent per week     Comment: not currently    Drug use: Yes     Frequency: 3.0 times per week     Types: Cannabis   Other Topics Concern    Caffeine Concern Yes     Comment: 2 can of coke every other day.     Exercise Yes     Comment: Takes long walks 3 times weekly.    Social History Narrative    Single    Work- [employer]-     Grad student.      Social Drivers of Health     Financial Resource Strain: Not on File (10/8/2022)    Received from [facility]    Financial Resource Strain      Financial Resource Strain: 0   Food Insecurity: Not on File (2024)    Received from [email or web address]    Food Insecurity     Food: 0   Transportation Needs: Not on File (10/8/2022)    [facility] from [email or web address]    Transportation Needs     Transportation: 0   Physical Activity: Not on File (10/8/2022)    Received from [email or web address]    Physical Activity     Physical Activity: 0   Stress: Not on File (10/8/2022)    Received from [email or web address]    Stress     Stress: 0   Social Connections: Not on File (2024)    Received from [email or web address]    Social Connections     Connectedness: 0   Housing Stability: Not on File (10/8/2022)    Received from [email or web address]    Housing Stability     Housin              Review of Systems   All other systems reviewed and are negative.      Positive for stated complaint: headaches  Other systems are as noted in HPI.  Constitutional and vital signs reviewed.      All other systems reviewed and negative except as noted above.    Physical Exam     ED Triage Vitals [10/31/24 1448]   /87   Pulse 84   Resp 20   Temp 96.8 °F (36 °C)   Temp src Temporal   SpO2 98 %   O2 Device        Current Vitals:   Vital Signs  BP: 147/87  Pulse: 84  Resp: 20  Temp: 96.8 °F (36 °C)  Temp src: Temporal    Oxygen Therapy  SpO2: 98 %        Physical Exam  Vitals and nursing note reviewed.   Constitutional:       General: She is not in acute distress.     Appearance: She is well-developed. She is not ill-appearing or toxic-appearing.   Eyes:      Extraocular Movements: Extraocular movements intact.      Pupils: Pupils are equal, round, and reactive to light.   Cardiovascular:      Rate and Rhythm: Normal rate.   Pulmonary:      Effort: Pulmonary effort is normal.   Skin:     General: Skin is warm and dry.   Neurological:      Mental Status: She is alert and oriented to person, place, and time.      Comments: NEURO:   Articulation intact.  No nystagmus appreciated. Negative pronator drift and Romberg. Normal finger to nose  . 5/5 UE and LE strength bilaterally. Normal muscle tone. Pt appears to have sensation to bilat upper and lower extremities. Coordination normal. Normal gait.  No facial droop.               ED Course   Labs Reviewed - No data to display                MDM     Medical Decision Making  29-year-old nonbinary with htn, spermatocele, reflux, arrhythmia presents complaining of headache intermittently for over a month that they state occurs after singing, straining, and sexual intercourse and is relieved with Advil.  Patient was treated with amoxicillin and Levaquin for sinusitis but saw other ENT who states is not sinusitis.  They state the pain is usually behind the right eye.  Patient has follow-up with the primary care doctor on Wednesday.  Their last MRI was in 2017      Patient coming in with headaches.   Differential diagnosis includes but not limited to headache, intracranial hemorrhage, migraine  Will treat for headache.  Will discharge on naproxen as needed, follow-up with primary care doctor as scheduled on Wednesday and follow-up with neurology. Patient/Parent is comfortable with this plan.    Patient with chronic headache over a month intermittent which is resolved with Advil.  Neuroexam is normal. I do not have concerns for immediate need for CT imaging. Patient will be discharged with Naproxen prn and follow up as scheduled on Wednesday with pmd and neuro follow up.        Problems Addressed:  Chronic nonintractable headache, unspecified headache type: acute illness or injury    Amount and/or Complexity of Data Reviewed  External Data Reviewed: radiology.     Details: CT head results reviewed from 8/8/2023 of the brain which was normal        Disposition and Plan     Clinical Impression:  1. Chronic nonintractable headache, unspecified headache type         Disposition:  Discharge  10/31/2024  3:13 pm    Follow-up:  Paulina Kohli DO  [address]  [address]  [phone]    Call             Medications Prescribed:  Discharge Medication List as of 10/31/2024  3:17 PM        START taking these medications    Details   naproxen 500 MG Oral Tab Take 1 tablet (500 mg total) by mouth 2 (two) times daily as needed., Normal, Disp-14 tablet, R-0                 Supplementary Documentation:

## 2024-10-31 NOTE — TELEPHONE ENCOUNTER
Patient scheduled thru My Chart the below appointment.  Triage if needed.      Appointment for: Re Jackson ([id number])   Visit type: MYCHART FOLLOW UP (1744)   11/6/2024 9:15 AM (15 minutes) with Ryley Chandra in [facility] [address] [place] [address]      Patient comments:   Headaches/eye pain after straining

## 2024-10-31 NOTE — ED INITIAL ASSESSMENT (HPI)
Pt states having headache, that started this morning after straining after having bowl movent. Pain is located between eyes by sinus and diffused out to temples. Sensitive to light. Pt states having some dizziness. Pt states this types of headaches usually don't last this long.

## 2024-10-31 NOTE — TELEPHONE ENCOUNTER
Called patient to follow up on \"Headaches/eye pain after straining\" comments made for reason why he scheduled OV with Dr. Chandra next Wed 11/6/24.    According to the patient, immediately after making appt via [facility], patient decided to walk into an UC (See visit note today 10/31/24). Patient was directed to take OTC naproxen as needed for headaches and follow up with neurology. Patient denies any severe headache or vision problems at this time. Patient states naproxen is helping control the headaches. ED/UC precautions reviewed and patient verbalized understanding. Patient to follow up with Dr. Chandra at scheduled OV.    Future Appointments   Date Time Provider Department Center   11/6/2024  9:15 AM Ryley Chandra MD [facility]

## 2024-10-31 NOTE — DISCHARGE INSTRUCTIONS
Take Naproxen as needed for headache and follow up with Neurology.  Keep your appointment with primary on Wednesday.  Go to ER with any worsening symptoms.

## 2024-11-05 ENCOUNTER — OFFICE VISIT (OUTPATIENT)
Dept: OTOLARYNGOLOGY | Facility: CLINIC | Age: 29
End: 2024-11-05

## 2024-11-05 DIAGNOSIS — R51.9 FACIAL PAIN: Primary | ICD-10-CM

## 2024-11-05 DIAGNOSIS — M26.609 TMJ (TEMPOROMANDIBULAR JOINT DISORDER): ICD-10-CM

## 2024-11-05 PROCEDURE — 99213 OFFICE O/P EST LOW 20 MIN: CPT | Performed by: STUDENT IN AN ORGANIZED HEALTH CARE EDUCATION/TRAINING PROGRAM

## 2024-11-05 PROCEDURE — 31231 NASAL ENDOSCOPY DX: CPT | Performed by: STUDENT IN AN ORGANIZED HEALTH CARE EDUCATION/TRAINING PROGRAM

## 2024-11-05 NOTE — PROGRESS NOTES
Re Jackson is a 29 year old adult.   Chief Complaint   Patient presents with    Follow - Up     Pain and pressure in sinus area under eyes      HPI:   29-year-old returns with persistent facial pressure primarily of the right eye area.  Significant history of bruxism    Current Outpatient Medications   Medication Sig Dispense Refill    naproxen 500 MG Oral Tab Take 1 tablet (500 mg total) by mouth 2 (two) times daily as needed. 14 tablet 0    progesterone 100 MG Oral Cap Take 1 capsule (100 mg total) by mouth nightly.      levocetirizine 5 MG Oral Tab TAKE ONE TABLET BY MOUTH EVERY EVENING 30 tablet 2    azelastine 137 MCG/SPRAY Nasal Solution INSTILL 2 SPRAYS IN EACH NOSTRIL TWICE DAILY 30 mL 1    polyethylene glycol, PEG 3350, 17 g Oral Powd Pack Take 17 g by mouth daily.      metoprolol succinate ER 25 MG Oral Tablet 24 Hr Take 1 tablet (25 mg total) by mouth daily. 90 tablet 1    mupirocin 2 % External Ointment Apply 1 Application topically daily. 1 each 0    clotrimazole 1 % External Cream Apply 1 Application topically daily. 1 each 0    Acetaminophen (TYLENOL OR)       IBUPROFEN OR       spironolactone 100 MG Oral Tab Take 1 tablet (100 mg total) by mouth daily.      estradiol 2 MG Oral Tab Take 3 tablets (6 mg total) by mouth daily.      Melatonin 10 MG Oral Cap Take 1 tablet by mouth nightly as needed. 6mg        Past Medical History:    Acid reflux    Arrhythmia    elevated heart rate since covid    Esophageal reflux    High blood pressure    Spermatocele      Social History:  Social History     Socioeconomic History    Marital status:    Tobacco Use    Smoking status: Former    Smokeless tobacco: Never    Tobacco comments:     only for 3 months in 2017   Vaping Use    Vaping status: Never Used   Substance and Sexual Activity    Alcohol use: Not Currently     Alcohol/week: 6.0 standard drinks of alcohol     Types: 6 Standard drinks or equivalent per week     Comment: not currently    Drug  use: Yes     Frequency: 3.0 times per week     Types: Cannabis   Other Topics Concern    Caffeine Concern Yes     Comment: 2 can of coke every other day.     Exercise Yes     Comment: Takes long walks 3 times weekly.    Social History Narrative    Single    Work- [employer]-     Grad student.      Social Drivers of Health     Financial Resource Strain: Not on File (10/8/2022)    Received from [facility]    Financial Resource Strain     Financial Resource Strain: 0   Food Insecurity: Not on File (2024)    Received from [facility]    Food Insecurity     Food: 0   Transportation Needs: Not on File (10/8/2022)    Received from [facility]    Transportation Needs     Transportation: 0   Physical Activity: Not on File (10/8/2022)    [facility]    Physical Activity     Physical Activity: 0   Stress: Not on File (10/8/2022)    Received from [facility]    Stress     Stress: 0   Social Connections: Not on File (2024)    Received from [facility]    Social Connections     Connectedness: 0   Housing Stability: Not on File (10/8/2022)    Received from [facility]    Housing Stability     Housin      Past Surgical History:   Procedure Laterality Date    Colonoscopy      Egd  2024    Dr. Enciso: Normal    Other      lipoma removal, L upper shoulder    Other      wisdom teeth     Other      lipoma removed from neck in 2016         EXAM:   There were no vitals taken for this visit.    System Details   Skin Inspection - Normal.   Constitutional Overall appearance - Normal.   Head/Face Symmetric, TMJ tenderness not present    Eyes EOMI, PERRL   Right ear:  Canal clear, TM intact, no STEVEN   Left ear:  Canal clear, TM intact, no STEVEN   Nose: Septum midline, inferior turbinates not enlarged, nasal valves without collapse    Oral cavity/Oropharynx: No lesions or masses on inspection or palpation, tonsils symmetric    Neck: Soft without LAD, thyroid not enlarged  Voice clear/ no stridor    Other:      SCOPES AND PROCEDURES:     Nasal Endoscopy Procedure Note     Due to inability for adequate examination of the nose and nasopharynx and need for magnification to perform the examination, endoscopy was performed.  Risks and benefits were discussed with patient/family and they have given verbal consent to proceed.    Pre-operative Diagnosis:   1. Facial pain    2. TMJ (temporomandibular joint disorder)        Post-operative Diagnosis: Same    Procedure: Diagnostic nasal endoscopy    Anesthesia: Topical anesthetic [place]     Surgeon Aroldo Abdul MD    EBL: 0cc    Procedure Detail & Findings:     After placement of topical anesthetic intranasally the endoscope was inserted into each nares and driven through the nasal cavity into the nasopharynx. The following findings were noted:    Septum: Midline  Inferior turbinates: Normal  Middle meatus: Patent  Middle turbinates: Normal  Purulence: None noted  Polyps: None noted  Nasopharynx and eustachian tube: No masses  Other: The middle and superior meatus, the turbinates, and the spheno-ethmoid recess were inspected and seen to be without significant abnormal findings.     Condition: Stable    Complications: Patient tolerated the procedure well with no immediate complication.    Aroldo Abdul MD    AUDIOGRAM AND IMAGING:         IMPRESSION:   1. Facial pain  - CT SINUS [facility] ENT (CPT=70486); Future    2. TMJ (temporomandibular joint disorder)       Recommendations:  -Nasal endoscopy was negative for purulence or sinusitis today  -Will obtain a CT scan to definitively look at the sinuses if they are contributing to the facial pressure  -Given the patient information as well regarding a TMJ specialist as the bruxism could be leading to this facial pain or referred ear discomfort  -Agree with neurology evaluation as well    The patient indicates understanding of these issues and agrees to the plan.      Aroldo Abdul MD  11/5/2024  1:54 PM

## 2024-11-08 ENCOUNTER — OFFICE VISIT (OUTPATIENT)
Dept: NEUROLOGY | Facility: CLINIC | Age: 29
End: 2024-11-08
Payer: COMMERCIAL

## 2024-11-08 VITALS
DIASTOLIC BLOOD PRESSURE: 70 MMHG | SYSTOLIC BLOOD PRESSURE: 120 MMHG | HEIGHT: 72 IN | BODY MASS INDEX: 31.69 KG/M2 | OXYGEN SATURATION: 99 % | HEART RATE: 69 BPM | RESPIRATION RATE: 16 BRPM | WEIGHT: 234 LBS

## 2024-11-08 DIAGNOSIS — G44.84 EXERTIONAL HEADACHE: Primary | ICD-10-CM

## 2024-11-08 PROCEDURE — 3008F BODY MASS INDEX DOCD: CPT | Performed by: OTHER

## 2024-11-08 PROCEDURE — 3074F SYST BP LT 130 MM HG: CPT | Performed by: OTHER

## 2024-11-08 PROCEDURE — 3078F DIAST BP <80 MM HG: CPT | Performed by: OTHER

## 2024-11-08 PROCEDURE — 99215 OFFICE O/P EST HI 40 MIN: CPT | Performed by: OTHER

## 2024-11-08 RX ORDER — SUMATRIPTAN 50 MG/1
TABLET, FILM COATED ORAL
Qty: 9 TABLET | Refills: 0 | Status: SHIPPED | OUTPATIENT
Start: 2024-11-08

## 2024-11-08 NOTE — PROGRESS NOTES
[facility] Neurology Outpatient Progress Note  Date of service: 11/8/2024    Assessment:     ICD-10-CM    1. Exertional headache  G44.84 MRA BRAIN (CPT=70544) [[id number]]      Worse    H/o head injury, concusion    Plan:      Procedures    MRA BRAIN (CPT=70544) [[id number]] to rule out aneurysm   Headache diary advised  Imitrex prn  See orders and medications filed with this encounter. The patient indicates understanding of these issues and agrees with the plan.  Discussed with patient regarding assessment, work up, care plan .  RTC 3 months  Pt should go ER for any new or worsening symptoms and contact office  Subjective:   History:  Patient is here today for worsening headaches.  Patient stated headaches have changed in a major way. Straining (such as bowel movement) induced headache, severe, prolonged, light headedness, light sensitive, sometimes lasts days. Pt is concerned about this new type headache.  Pt last seen for concussion, stated concussion related symptoms have improved.    History/Other:   REVIEW OF SYSTEMS:  A 10-point system was reviewed. Pertinent positives and negatives are noted as above       Current Outpatient Medications:     SUMAtriptan 50 MG Oral Tab, Use at onset; repeat once after 2 hours-ONLY 2 IN 24 HR MAX. This is a 30 day supply., Disp: 9 tablet, Rfl: 0    progesterone 100 MG Oral Cap, Take 1 capsule (100 mg total) by mouth nightly., Disp: , Rfl:     levocetirizine 5 MG Oral Tab, TAKE ONE TABLET BY MOUTH EVERY EVENING, Disp: 30 tablet, Rfl: 2    azelastine 137 MCG/SPRAY Nasal Solution, INSTILL 2 SPRAYS IN EACH NOSTRIL TWICE DAILY, Disp: 30 mL, Rfl: 1    polyethylene glycol, PEG 3350, 17 g Oral Powd Pack, Take 17 g by mouth daily., Disp: , Rfl:     metoprolol succinate ER 25 MG Oral Tablet 24 Hr, Take 1 tablet (25 mg total) by mouth daily., Disp: 90 tablet, Rfl: 1    mupirocin 2 % External Ointment, Apply 1 Application topically daily., Disp: 1 each, Rfl: 0    clotrimazole 1 % External Cream, Apply  1 Application topically daily., Disp: 1 each, Rfl: 0    Acetaminophen (TYLENOL OR), , Disp: , Rfl:     IBUPROFEN OR, , Disp: , Rfl:     spironolactone 100 MG Oral Tab, Take 1 tablet (100 mg total) by mouth daily., Disp: , Rfl:     estradiol 2 MG Oral Tab, Take 3 tablets (6 mg total) by mouth daily., Disp: , Rfl:     Melatonin 10 MG Oral Cap, Take 1 tablet by mouth nightly as needed. 6mg, Disp: , Rfl:   Allergies:  Allergies[1]  Past Medical History:    Acid reflux    Arrhythmia    elevated heart rate since covid    Esophageal reflux    High blood pressure    Spermatocele     Past Surgical History:   Procedure Laterality Date    Colonoscopy      Egd  04/25/2024    Dr. Enciso: Normal    Other      lipoma removal, L upper shoulder    Other      wisdom teeth     Other      lipoma removed from neck in 2016     Social History:  Social History     Tobacco Use    Smoking status: Former    Smokeless tobacco: Never    Tobacco comments:     only for 3 months in 2017   Substance Use Topics    Alcohol use: Not Currently     Alcohol/week: 6.0 standard drinks of alcohol     Types: 6 Standard drinks or equivalent per week     Comment: not currently     Family History   Problem Relation Age of Onset    Asthma Mother     Anxiety Mother     Depression Father         SSRI    Hypertension Father     Other (sleep apnea) Father     Cancer Maternal Grandmother     Alcohol and Other Disorders Associated Maternal Grandmother     Cancer Maternal Grandfather     Alcohol and Other Disorders Associated Maternal Grandfather     Diabetes Paternal Grandfather     Heart Disorder Paternal Grandfather     OCD Sister     Depression Brother      Objective:   Neurological Examination:  /70   Pulse 69   Resp 16   Ht 73\"   Wt 234 lb (106.1 kg)   SpO2 99%   BMI 30.87 kg/m²   Mental status: A & O X 3  Language: no aphasia  Speech: no dysarthria  CN II-XII: intact   Motor strength: 5/5 all extremities  Tone: normal  DTRs: 2+  symmetric  Coordination: normal  Sensory: symmetric  Gait: normal    Test reviewed on 11/8/2024      Urbano \"Jim\" MD Abraham  Neurology  [facility]  11/8/2024, 9:30 AM  CC: Ryley Chandra MD       [1]   Allergies  Allergen Reactions    Dander HIVES     Dogs & cats. Some dog breeds = Hives; but usually, just sneezing    Adhesive Tape RASH

## 2024-11-08 NOTE — PATIENT INSTRUCTIONS
Refill policies:    Allow 2-3 business days for refills; controlled substances may take longer.  Contact your pharmacy at least 5 days prior to running out of medication and have them send an electronic request or submit request through the “request refill” option in your [employer] account.  Refills are not addressed on weekends; covering physicians do not authorize routine medications on weekends.  No narcotics or controlled substances are refilled after noon on Fridays or by on call physicians.  By law, narcotics must be electronically prescribed.  A 30 day supply with no refills is the maximum allowed.  If your prescription is due for a refill, you may be due for a follow up appointment.  To best provide you care, patients receiving routine medications need to be seen at least once a year.  Patients receiving narcotic/controlled substance medications need to be seen at least once every 3 months.  In the event that your preferred pharmacy does not have the requested medication in stock (e.g. Backordered), it is your responsibility to find another pharmacy that has the requested medication available.  We will gladly send a new prescription to that pharmacy at your request.    Scheduling Tests:    If your physician has ordered radiology tests such as MRI or CT scans, please contact Central Scheduling at [phone] right away to schedule the test.  Once scheduled, the [facility] Centralized Referral Team will work with your insurance carrier to obtain pre-certification or prior authorization.  Depending on your insurance carrier, approval may take 3-10 days.  It is highly recommended patients assure they have received an authorization before having a test performed.  If test is done without insurance authorization, patient may be responsible for the entire amount billed.      Precertification and Prior Authorizations:  If your physician has recommended that you have a procedure or additional testing performed the [facility]  Centralized Referral Team will contact your insurance carrier to obtain pre-certification or prior authorization.    You are strongly encouraged to contact your insurance carrier to verify that your procedure/test has been approved and is a COVERED benefit.  Although the [facility] Centralized Referral Team does its due diligence, the insurance carrier gives the disclaimer that \"Although the procedure is authorized, this does not guarantee payment.\"    Ultimately the patient is responsible for payment.   Thank you for your understanding in this matter.  Paperwork Completion:  If you require FMLA or disability paperwork for your recovery, please make sure to either drop it off or have it faxed to our office at [phone]. Be sure the form has your name and date of birth on it.  The form will be faxed to our Forms Department and they will complete it for you.  There is a 25$ fee for all forms that need to be filled out.  Please be aware there is a 10-14 day turnaround time.  You will need to sign a release of information (CORNELIO) form if your paperwork does not come with one.  You may call the Forms Department with any questions at [phone].  Their fax number is [phone].

## 2024-11-27 ENCOUNTER — MED REC SCAN ONLY (OUTPATIENT)
Dept: FAMILY MEDICINE CLINIC | Facility: CLINIC | Age: 29
End: 2024-11-27

## 2024-12-05 ENCOUNTER — HOSPITAL ENCOUNTER (OUTPATIENT)
Dept: MRI IMAGING | Age: 29
Discharge: HOME | End: 2024-12-05
Attending: Other
Payer: COMMERCIAL

## 2024-12-05 DIAGNOSIS — G44.84 EXERTIONAL HEADACHE: ICD-10-CM

## 2024-12-05 PROCEDURE — 70544 MR ANGIOGRAPHY HEAD W/O DYE: CPT | Performed by: OTHER

## 2024-12-29 ENCOUNTER — HOSPITAL ENCOUNTER (OUTPATIENT)
Age: 29
Discharge: HOME | End: 2024-12-29
Payer: COMMERCIAL

## 2024-12-29 VITALS
HEART RATE: 81 BPM | TEMPERATURE: 98 F | RESPIRATION RATE: 20 BRPM | BODY MASS INDEX: 29.12 KG/M2 | HEIGHT: 72 IN | WEIGHT: 215 LBS | DIASTOLIC BLOOD PRESSURE: 78 MMHG | OXYGEN SATURATION: 100 % | SYSTOLIC BLOOD PRESSURE: 140 MMHG

## 2024-12-29 DIAGNOSIS — R30.0 DYSURIA: Primary | ICD-10-CM

## 2024-12-29 LAB
BILIRUB UR QL STRIP: NEGATIVE
CLARITY UR: CLEAR
COLOR UR: YELLOW
GLUCOSE UR STRIP-MCNC: NEGATIVE MG/DL
HGB UR QL STRIP: NEGATIVE
KETONES UR STRIP-MCNC: NEGATIVE MG/DL
LEUKOCYTE ESTERASE UR QL STRIP: NEGATIVE
NITRITE UR QL STRIP: NEGATIVE
PH UR STRIP: 6 [PH]
PROT UR STRIP-MCNC: NEGATIVE MG/DL
SP GR UR STRIP: <=1.005
UROBILINOGEN UR STRIP-ACNC: <2 MG/DL

## 2024-12-29 PROCEDURE — 87086 URINE CULTURE/COLONY COUNT: CPT | Performed by: PHYSICIAN ASSISTANT

## 2024-12-29 PROCEDURE — 81002 URINALYSIS NONAUTO W/O SCOPE: CPT

## 2024-12-29 PROCEDURE — 99214 OFFICE O/P EST MOD 30 MIN: CPT

## 2024-12-29 PROCEDURE — 99213 OFFICE O/P EST LOW 20 MIN: CPT

## 2024-12-29 NOTE — DISCHARGE INSTRUCTIONS
Please return to the ER/clinic if symptoms worsen. Follow-up with your PCP in 24-48 hours as needed.    Continue to use the cream that is helping.  We will send the urine out for culture.  We will contact you if anything needs to be added.  Push fluids.  Follow-up with your primary care physician.

## 2024-12-29 NOTE — ED PROVIDER NOTES
Patient Seen in: [facility]      History   No chief complaint on file.    Stated Complaint: UTI    Subjective:   HPI    Patient here with complaint of irritation around the urethra.  Patient states that she has been using cream which is helping however wanted to know if she had a UTI.  Patient denies chest pain, shortness of breath, cough, abdominal pain, nausea, vomiting or diarrhea.  Patient unaware of hematuria denies flank pain.  Afebrile.      Objective:     Past Medical History:    Acid reflux    Arrhythmia    elevated heart rate since covid    Esophageal reflux    High blood pressure    Spermatocele              The patient's medication list, past medical history and social history elements  as listed in today's nurse's notes are reviewed and agree.   The patient's family history is reviewed and is noncontributory to the presenting problem, except as indicated as above.   Past Surgical History:   Procedure Laterality Date    Colonoscopy      Egd  04/25/2024    Dr. Enciso: Normal    Other      lipoma removal, L upper shoulder    Other      wisdom teeth     Other      lipoma removed from neck in 2016                Social History     Socioeconomic History    Marital status:    Tobacco Use    Smoking status: Former    Smokeless tobacco: Never    Tobacco comments:     only for 3 months in 2017   Vaping Use    Vaping status: Never Used   Substance and Sexual Activity    Alcohol use: Not Currently     Alcohol/week: 6.0 standard drinks of alcohol     Types: 6 Standard drinks or equivalent per week     Comment: not currently    Drug use: Not Currently     Types: Cannabis   Other Topics Concern    Caffeine Concern Yes     Comment: 2 can of coke every other day.     Exercise Yes     Comment: Takes long walks 3 times weekly.    Social History Narrative    Single    Work- [employer]-     Grad student.      Social Drivers of Health     Financial Resource Strain: Not on File  (10/8/2022)    Received from [facility]    Financial Resource Strain     Financial Resource Strain: 0   Food Insecurity: Not on File (2024)    Received from [facility]    Food Insecurity     Food: 0   Transportation Needs: Not on File (10/8/2022)    Received from [facility]    Transportation Needs     Transportation: 0   Physical Activity: Not on File (10/8/2022)    Received from [facility]    Physical Activity     Physical Activity: 0   Stress: Not on File (10/8/2022)    Received from [facility]    Stress     Stress: 0   Social Connections: Not on File (2024)    Received from [facility]    Social Connections     Connectedness: 0   Housing Stability: Not on File (10/8/2022)    Received from [facility]    Housing Stability     Housin              Review of Systems    Positive for stated complaint: UTI  Other systems are as noted in HPI.  Constitutional and vital signs reviewed.      All other systems reviewed and negative except as noted above.    Physical Exam     ED Triage Vitals [24 1538]   /78   Pulse 81   Resp 20   Temp 98 °F (36.7 °C)   Temp src Oral   SpO2 100 %   O2 Device None (Room air)       Current Vitals:   Vital Signs  BP: 140/78  Pulse: 81  Resp: 20  Temp: 98 °F (36.7 °C)  Temp src: Oral    Oxygen Therapy  SpO2: 100 %  O2 Device: None (Room air)        Physical Exam  Vitals and nursing note reviewed. Exam conducted with a chaperone present.   Constitutional:       Appearance: Normal appearance. She is well-developed.   HENT:      Head: Normocephalic.      Right Ear: External ear normal.      Left Ear: External ear normal.      Nose: Nose normal.      Mouth/Throat:      Mouth: Mucous membranes are moist.   Eyes:      Conjunctiva/sclera: Conjunctivae normal.      Pupils: Pupils are equal, round, and reactive to light.   Cardiovascular:      Rate and Rhythm: Normal rate and regular rhythm.      Heart sounds: Normal heart sounds.   Pulmonary:      Effort: Pulmonary effort is  normal.      Breath sounds: Normal breath sounds.   Genitourinary:     Penis: Normal and circumcised.       Testes: Normal. Cremasteric reflex is present.   Musculoskeletal:      Cervical back: Normal range of motion and neck supple.   Skin:     General: Skin is warm.      Capillary Refill: Capillary refill takes less than 2 seconds.   Neurological:      General: No focal deficit present.      Mental Status: She is alert and oriented to person, place, and time.   Psychiatric:         Mood and Affect: Mood normal.         Behavior: Behavior normal.         Thought Content: Thought content normal.         Judgment: Judgment normal.           ED Course     Labs Reviewed   [facility] POCT URINALYSIS DIPSTICK   URINE CULTURE, ROUTINE                   MDM       Clinical Impression: dysuria  Course of Treatment:   Continue to use the cream that is helping.  We will send the urine out for culture.  We will contact you if anything needs to be added.  Push fluids.  Follow-up with your primary care physician.    The patient is encouraged to return if any concerning symptoms arise. Additional verbal discharge instructions are given and discussed. Discharge medications are discussed. The patient is in good condition throughout the visit today and remains so upon discharge. I discuss the plan of care with the patient, who expresses understanding. All questions and concerns are addressed to the patient's satisfaction prior to discharge today.  Previous conversations with PCP and charts were reviewed.            Disposition and Plan     Clinical Impression:  1. Dysuria         Disposition:  Discharge  12/29/2024  4:19 pm    Follow-up:  Ryley Chandra MD  [address] [place] [id number]  [phone]                Medications Prescribed:  Current Discharge Medication List              Supplementary Documentation:

## 2025-01-20 RX ORDER — LEVOCETIRIZINE DIHYDROCHLORIDE 5 MG/1
5 TABLET, FILM COATED ORAL EVERY EVENING
Qty: 30 TABLET | Refills: 2 | Status: SHIPPED | OUTPATIENT
Start: 2025-01-20

## 2025-03-27 ENCOUNTER — PATIENT MESSAGE (OUTPATIENT)
Dept: FAMILY MEDICINE CLINIC | Facility: CLINIC | Age: 30
End: 2025-03-27

## 2025-03-27 DIAGNOSIS — R00.2 PALPITATIONS: ICD-10-CM

## 2025-03-27 RX ORDER — METOPROLOL SUCCINATE 25 MG/1
25 TABLET, EXTENDED RELEASE ORAL DAILY
Qty: 90 TABLET | Refills: 1 | Status: SHIPPED | OUTPATIENT
Start: 2025-03-27

## 2025-03-27 NOTE — TELEPHONE ENCOUNTER
, patient requesting metoprolol 25 mg refill.     LOV:10/16/24, Do you want patient to schedule office visit before refill?

## (undated) DEVICE — KIT CLEAN ENDOKIT 1.1OZ GOWNX2

## (undated) DEVICE — Device: Brand: DUAL NARE NASAL CANNULAE FEMALE LUER CON 7FT O2 TUBE

## (undated) DEVICE — FORCEPS BX L240CM DIA2.4MM L NDL RAD JAW 4

## (undated) DEVICE — KIT ENDO ORCAPOD 160/180/190

## (undated) DEVICE — GIJAW SINGLE-USE BIOPSY FORCEPS WITH NEEDLE: Brand: GIJAW

## (undated) DEVICE — MEDI-VAC NON-CONDUCTIVE SUCTION TUBING 6MM X 1.8M (6FT.) L: Brand: CARDINAL HEALTH

## (undated) DEVICE — MEDI-VAC NON-CONDUCTIVE SUCTION TUBING: Brand: CARDINAL HEALTH

## (undated) DEVICE — YANKAUER,BULB TIP,W/O VENT,RIGID,STERILE: Brand: MEDLINE

## (undated) DEVICE — 60 ML SYRINGE REGULAR TIP: Brand: MONOJECT

## (undated) DEVICE — CONMED SCOPE SAVER BITE BLOCK, 20X27 MM: Brand: SCOPE SAVER

## (undated) NOTE — LETTER
[facility]  Administration of Anesthesia  I, Re Jackson agree to be cared for by a physician anesthesiologist alone and/or with a nurse anesthetist, who is specially trained to monitor me and give me medicine to put me to sleep or keep me comfortable during my procedure    I understand that my anesthesiologist and/or anesthetist is not an employee or agent of [facility] or [employer]. He or she works for [facility]    As the patient asking for anesthesia services, I agree to:  Allow the anesthesiologist (anesthesia doctor) to give me medicine and do additional procedures as necessary. Some examples are: Starting or using an “IV” to give me medicine, fluids or blood during my procedure, and having a breathing tube placed to help me breathe when I’m asleep (intubation). In the event that my heart stops working properly, I understand that my anesthesiologist will make every effort to sustain my life, unless otherwise directed by [facility] Do Not Resuscitate documents.  Tell my anesthesia doctor before my procedure:  If I am pregnant.  The last time that I ate or drank.  iii. All of the medicines I take (including prescriptions, herbal supplements, and pills I can buy without a prescription (including street drugs/illegal medications). Failure to inform my anesthesiologist about these medicines may increase my risk of anesthetic complications.  iv.If I am allergic to anything or have had a reaction to anesthesia before.  I understand how the anesthesia medicine will help me (benefits).  I understand that with any type of anesthesia medicine there are risks:  The most common risks are: nausea, vomiting, sore throat, muscle soreness, damage to my eyes, mouth, or teeth (from breathing tube placement).  Rare risks include: remembering what happened during my procedure, allergic reactions to medications, injury to my airway, heart, lungs, vision, nerves, or  muscles and in extremely rare instances death.  My doctor has explained to me other choices available to me for my care (alternatives).  Pregnant Patients (“epidural”):  I understand that the risks of having an epidural (medicine given into my back to help control pain during labor), include itching, low blood pressure, difficulty urinating, headache or slowing of the baby’s heart. Very rare risks include infection, bleeding, seizure, irregular heart rhythms and nerve injury.  Regional Anesthesia (“spinal”, “epidural”, & “nerve blocks”):  I understand that rare but potential complications include headache, bleeding, infection, seizure, irregular heart rhythms, and nerve injury.    _____________________________________________________________________________  Patient (or Representative) Signature/Relationship to Patient  Date   Time    _____________________________________________________________________________   Name (if used)    Language/Organization   Time    _____________________________________________________________________________  Nurse Anesthetist Signature     Date   Time  _____________________________________________________________________________  Anesthesiologist Signature     Date   Time  I have discussed the procedure and information above with the patient (or patient’s representative) and answered their questions. The patient or their representative has agreed to have anesthesia services.    _____________________________________________________________________________  Witness        Date   Time  I have verified that the signature is that of the patient or patient’s representative, and that it was signed before the procedure  Patient Name: Re Jackson     : 1995                 Printed: 2024 at 2:54 PM    Medical Record #: [id number]                                            Page 1 of 1  ----------ANESTHESIA CONSENT----------

## (undated) NOTE — LETTER
4/2/2024              Re Jackson        [address] [place] [address]         Dear Re,    Our records indicate that the tests ordered for you by Belem Enciso MD  have not been done.  If you have, in fact, already completed the tests or you do not wish to have the tests done, please contact our office at THE NUMBER LISTED BELOW.  Otherwise, please proceed with the testing.      [facility] GI GASTRIC EMPTYING STUDY  (CPT=78264) ([id number]) [[id number]] (Order [id number])         Sincerely,    Belem Enciso MD  [address]  [facility] [address] [phone]

## (undated) NOTE — LETTER
[facility]  Administration of Anesthesia  I, Re Jackson agree to be cared for by a physician anesthesiologist alone and/or with a nurse anesthetist, who is specially trained to monitor me and give me medicine to put me to sleep or keep me comfortable during my procedure    I understand that my anesthesiologist and/or anesthetist is not an employee or agent of [facility] or [employer]. He or she works for [facility]    As the patient asking for anesthesia services, I agree to:  Allow the anesthesiologist (anesthesia doctor) to give me medicine and do additional procedures as necessary. Some examples are: Starting or using an “IV” to give me medicine, fluids or blood during my procedure, and having a breathing tube placed to help me breathe when I’m asleep (intubation). In the event that my heart stops working properly, I understand that my anesthesiologist will make every effort to sustain my life, unless otherwise directed by [facility] Do Not Resuscitate documents.  Tell my anesthesia doctor before my procedure:  If I am pregnant.  The last time that I ate or drank.  iii. All of the medicines I take (including prescriptions, herbal supplements, and pills I can buy without a prescription (including street drugs/illegal medications). Failure to inform my anesthesiologist about these medicines may increase my risk of anesthetic complications.  iv.If I am allergic to anything or have had a reaction to anesthesia before.  I understand how the anesthesia medicine will help me (benefits).  I understand that with any type of anesthesia medicine there are risks:  The most common risks are: nausea, vomiting, sore throat, muscle soreness, damage to my eyes, mouth, or teeth (from breathing tube placement).  Rare risks include: remembering what happened during my procedure, allergic reactions to medications, injury to my airway, heart, lungs, vision, nerves, or  muscles and in extremely rare instances death.  My doctor has explained to me other choices available to me for my care (alternatives).  Pregnant Patients (“epidural”):  I understand that the risks of having an epidural (medicine given into my back to help control pain during labor), include itching, low blood pressure, difficulty urinating, headache or slowing of the baby’s heart. Very rare risks include infection, bleeding, seizure, irregular heart rhythms and nerve injury.  Regional Anesthesia (“spinal”, “epidural”, & “nerve blocks”):  I understand that rare but potential complications include headache, bleeding, infection, seizure, irregular heart rhythms, and nerve injury.    _____________________________________________________________________________  Patient (or Representative) Signature/Relationship to Patient  Date   Time    _____________________________________________________________________________   Name (if used)    Language/Organization   Time    _____________________________________________________________________________  Nurse Anesthetist Signature     Date   Time  _____________________________________________________________________________  Anesthesiologist Signature     Date   Time  I have discussed the procedure and information above with the patient (or patient’s representative) and answered their questions. The patient or their representative has agreed to have anesthesia services.    _____________________________________________________________________________  Witness        Date   Time  I have verified that the signature is that of the patient or patient’s representative, and that it was signed before the procedure  Patient Name: Re Jackson     : 1995                 Printed: 2024 at 7:23 AM    Medical Record #: [id number]                                            Page 1 of 1  ----------ANESTHESIA CONSENT----------

## (undated) NOTE — LETTER
Date & Time: 1/20/2023, 1:40 PM  Patient: Kasia Mares  Encounter Provider(s):    ADA Marvin       To Whom It May Concern:    Kaci Monterroso was seen and treated in our department on 1/20/2023. She should not return to work until 01/21/2023. If you have any questions or concerns, please do not hesitate to call.       Annabell Oppenheim APRN   Nurse Practitioner

## (undated) NOTE — LETTER
[facility]  [address], [place], [place]  Authorization for Surgical Operation and Procedure                                                                                           I hereby authorize Belem Enciso MD, my physician and his/her assistants (if applicable), which may include medical students, residents, and/or fellows, to perform the following surgical operation/ procedure and administer such anesthesia as may be determined necessary by my physician: Operation/Procedure name (s) ESOPHAGOGASTRODUODENOSCOPY on Re Jackson   2.   I recognize that during the surgical operation/procedure, unforeseen conditions may necessitate additional or different procedures than those listed above.  I, therefore, further authorize and request that the above-named surgeon, assistants, or designees perform such procedures as are, in their judgment, necessary and desirable.    3.   My surgeon/physician has discussed prior to my surgery the potential benefits, risks and side effects of this procedure; the likelihood of achieving goals; and potential problems that might occur during recuperation.  They also discussed reasonable alternatives to the procedure, including risks, benefits, and side effects related to the alternatives and risks related to not receiving this procedure.  I have had all my questions answered and I acknowledge that no guarantee has been made as to the result that may be obtained.    4.   Should the need arise during my operation/procedure, which includes change of level of care prior to discharge, I also consent to the administration of blood and/or blood products.  Further, I understand that despite careful testing and screening of blood or blood products by collecting agencies, I may still be subject to ill effects as a result of receiving a blood transfusion and/or blood products.  The following are some, but not all, of the potential risks that can  occur: fever and allergic reactions, hemolytic reactions, transmission of diseases such as Hepatitis, AIDS and Cytomegalovirus (CMV) and fluid overload.  In the event that I wish to have an autologous transfusion of my own blood, or a directed donor transfusion, I will discuss this with my physician.  Check only if Refusing Blood or Blood Products  I understand refusal of blood or blood products as deemed necessary by my physician may have serious consequences to my condition to include possible death. I hereby assume responsibility for my refusal and release the hospital, its personnel, and my physicians from any responsibility for the consequences of my refusal.    o  Refuse   5.   I authorize the use of any specimen, organs, tissues, body parts or foreign objects that may be removed from my body during the operation/procedure for diagnosis, research or teaching purposes and their subsequent disposal by hospital authorities.  I also authorize the release of specimen test results and/or written reports to my treating physician on the hospital medical staff or other referring or consulting physicians involved in my care, at the discretion of the Pathologist or my treating physician.    6.   I consent to the photographing or videotaping of the operations or procedures to be performed, including appropriate portions of my body for medical, scientific, or educational purposes, provided my identity is not revealed by the pictures or by descriptive texts accompanying them.  If the procedure has been photographed/videotaped, the surgeon will obtain the original picture, image, videotape or CD.  The hospital will not be responsible for storage, release or maintenance of the picture, image, tape or CD.    7.   I consent to the presence of a  or observers in the operating room as deemed necessary by my physician or their designees.    8.   I recognize that in the event my procedure results in extended  X-Ray/fluoroscopy time, I may develop a skin reaction.    9. If I have a Do Not Attempt Resuscitation (DNAR) order in place, that status will be suspended while in the operating room, procedural suite, and during the recovery period unless otherwise explicitly stated by me (or a person authorized to consent on my behalf). The surgeon or my attending physician will determine when the applicable recovery period ends for purposes of reinstating the DNAR order.  10. Patients having a sterilization procedure: I understand that if the procedure is successful the results will be permanent and it will therefore be impossible for me to inseminate, conceive, or bear children.  I also understand that the procedure is intended to result in sterility, although the result has not been guaranteed.   11. I acknowledge that my physician has explained sedation/analgesia administration to me including the risk and benefits I consent to the administration of sedation/analgesia as may be necessary or desirable in the judgment of my physician.    I CERTIFY THAT I HAVE READ AND FULLY UNDERSTAND THE ABOVE CONSENT TO OPERATION and/or OTHER PROCEDURE.     _________________________________________ _________________________________     ___________________________________  Signature of Patient     Signature of Responsible Person                   Printed Name of Responsible Person                              _________________________________________ ______________________________        ___________________________________  Signature of Witness         Date  Time         Relationship to Patient    STATEMENT OF PHYSICIAN My signature below affirms that prior to the time of the procedure; I have explained to the patient and/or his/her legal representative, the risks and benefits involved in the proposed treatment and any reasonable alternative to the proposed treatment. I have also explained the risks and benefits involved in refusal of the  proposed treatment and alternatives to the proposed treatment and have answered the patient's questions. If I have a significant financial interest in a co-management agreement or a significant financial interest in any product or implant, or other significant relationship used in this procedure/surgery, I have disclosed this and had a discussion with my patient.     _______________________________________________________________ _____________________________  (Signature of Physician)                                                                                         (Date)                                   (Time)  Patient Name: Re Jackson    : 1995   Printed: 2024      Medical Record #: [id number]                                              Page 1 of 1

## (undated) NOTE — LETTER
[facility] [address], [place], [place]  Authorization for Surgical Operation and Procedure                                                                                           I hereby authorize Belem Enciso MD, my physician and his/her assistants (if applicable), which may include medical students, residents, and/or fellows, to perform the following surgical operation/ procedure and administer such anesthesia as may be determined necessary by my physician: Operation/Procedure name (s) ESOPHAGOGASTRODUODENOSCOPY (EGD) on Re Jackson   2.   I recognize that during the surgical operation/procedure, unforeseen conditions may necessitate additional or different procedures than those listed above.  I, therefore, further authorize and request that the above-named surgeon, assistants, or designees perform such procedures as are, in their judgment, necessary and desirable.    3.   My surgeon/physician has discussed prior to my surgery the potential benefits, risks and side effects of this procedure; the likelihood of achieving goals; and potential problems that might occur during recuperation.  They also discussed reasonable alternatives to the procedure, including risks, benefits, and side effects related to the alternatives and risks related to not receiving this procedure.  I have had all my questions answered and I acknowledge that no guarantee has been made as to the result that may be obtained.    4.   Should the need arise during my operation/procedure, which includes change of level of care prior to discharge, I also consent to the administration of blood and/or blood products.  Further, I understand that despite careful testing and screening of blood or blood products by collecting agencies, I may still be subject to ill effects as a result of receiving a blood transfusion and/or blood products.  The following are some, but not all, of the potential risks that  can occur: fever and allergic reactions, hemolytic reactions, transmission of diseases such as Hepatitis, AIDS and Cytomegalovirus (CMV) and fluid overload.  In the event that I wish to have an autologous transfusion of my own blood, or a directed donor transfusion, I will discuss this with my physician.  Check only if Refusing Blood or Blood Products  I understand refusal of blood or blood products as deemed necessary by my physician may have serious consequences to my condition to include possible death. I hereby assume responsibility for my refusal and release the hospital, its personnel, and my physicians from any responsibility for the consequences of my refusal.    o  Refuse   5.   I authorize the use of any specimen, organs, tissues, body parts or foreign objects that may be removed from my body during the operation/procedure for diagnosis, research or teaching purposes and their subsequent disposal by hospital authorities.  I also authorize the release of specimen test results and/or written reports to my treating physician on the hospital medical staff or other referring or consulting physicians involved in my care, at the discretion of the Pathologist or my treating physician.    6.   I consent to the photographing or videotaping of the operations or procedures to be performed, including appropriate portions of my body for medical, scientific, or educational purposes, provided my identity is not revealed by the pictures or by descriptive texts accompanying them.  If the procedure has been photographed/videotaped, the surgeon will obtain the original picture, image, videotape or CD.  The hospital will not be responsible for storage, release or maintenance of the picture, image, tape or CD.    7.   I consent to the presence of a  or observers in the operating room as deemed necessary by my physician or their designees.    8.   I recognize that in the event my procedure results in extended  X-Ray/fluoroscopy time, I may develop a skin reaction.    9. If I have a Do Not Attempt Resuscitation (DNAR) order in place, that status will be suspended while in the operating room, procedural suite, and during the recovery period unless otherwise explicitly stated by me (or a person authorized to consent on my behalf). The surgeon or my attending physician will determine when the applicable recovery period ends for purposes of reinstating the DNAR order.  10. Patients having a sterilization procedure: I understand that if the procedure is successful the results will be permanent and it will therefore be impossible for me to inseminate, conceive, or bear children.  I also understand that the procedure is intended to result in sterility, although the result has not been guaranteed.   11. I acknowledge that my physician has explained sedation/analgesia administration to me including the risk and benefits I consent to the administration of sedation/analgesia as may be necessary or desirable in the judgment of my physician.    I CERTIFY THAT I HAVE READ AND FULLY UNDERSTAND THE ABOVE CONSENT TO OPERATION and/or OTHER PROCEDURE.     _________________________________________ _________________________________     ___________________________________  Signature of Patient     Signature of Responsible Person                   Printed Name of Responsible Person                              _________________________________________ ______________________________        ___________________________________  Signature of Witness         Date  Time         Relationship to Patient    STATEMENT OF PHYSICIAN My signature below affirms that prior to the time of the procedure; I have explained to the patient and/or his/her legal representative, the risks and benefits involved in the proposed treatment and any reasonable alternative to the proposed treatment. I have also explained the risks and benefits involved in refusal of the  proposed treatment and alternatives to the proposed treatment and have answered the patient's questions. If I have a significant financial interest in a co-management agreement or a significant financial interest in any product or implant, or other significant relationship used in this procedure/surgery, I have disclosed this and had a discussion with my patient.     _______________________________________________________________ _____________________________  (Signature of Physician)                                                                                         (Date)                                   (Time)  Patient Name: Re Jackson    : 1995   Printed: 2024      Medical Record #: [id number]                                              Page 1 of 1

## (undated) NOTE — MR AVS SNAPSHOT
[facility]  [address] [address] [phone]               Thank you for choosing us for your health care visit with Elsy Bowling MD.  We are glad to serve you and happy to provide you with this summary of yo Educational Information     Your blood pressure indicates you may be at-risk for Hypertension. Please consider the following Lifestyle Modifications. Also, please return for a follow-up Blood Pressure Check in 1 month.      Lifestyle Modification Recomme Start activities slowly and build up over time Do what you like   Get your heart pumping – brisk walking, biking, swimming Even 10 minute increments are effective and add up over the week   2 ½ hours per week – spread out over time Use a colt to keep you

## (undated) NOTE — MR AVS SNAPSHOT
[facility]  [address] [address] [phone]               Thank you for choosing us for your health care visit with Sharron Cohen MD.  We are glad to serve you and happy to provide you with this summary of yo Visit [facility] online at  [facility].tn